# Patient Record
Sex: MALE | Race: WHITE | NOT HISPANIC OR LATINO | Employment: OTHER | URBAN - METROPOLITAN AREA
[De-identification: names, ages, dates, MRNs, and addresses within clinical notes are randomized per-mention and may not be internally consistent; named-entity substitution may affect disease eponyms.]

---

## 2017-01-26 ENCOUNTER — OFFICE VISIT (OUTPATIENT)
Dept: FAMILY MEDICINE | Facility: CLINIC | Age: 62
End: 2017-01-26
Attending: FAMILY MEDICINE
Payer: COMMERCIAL

## 2017-01-26 ENCOUNTER — LAB VISIT (OUTPATIENT)
Dept: LAB | Facility: HOSPITAL | Age: 62
End: 2017-01-26
Attending: FAMILY MEDICINE
Payer: COMMERCIAL

## 2017-01-26 VITALS
WEIGHT: 258.38 LBS | DIASTOLIC BLOOD PRESSURE: 70 MMHG | SYSTOLIC BLOOD PRESSURE: 108 MMHG | HEIGHT: 73 IN | BODY MASS INDEX: 34.24 KG/M2 | HEART RATE: 80 BPM | OXYGEN SATURATION: 97 %

## 2017-01-26 DIAGNOSIS — I15.2 HYPERTENSION ASSOCIATED WITH DIABETES: ICD-10-CM

## 2017-01-26 DIAGNOSIS — E11.42 DIABETIC PERIPHERAL NEUROPATHY ASSOCIATED WITH TYPE 2 DIABETES MELLITUS: ICD-10-CM

## 2017-01-26 DIAGNOSIS — E29.1 HYPOGONADISM, MALE: ICD-10-CM

## 2017-01-26 DIAGNOSIS — E11.42 TYPE 2 DIABETES MELLITUS WITH DIABETIC POLYNEUROPATHY, WITHOUT LONG-TERM CURRENT USE OF INSULIN: ICD-10-CM

## 2017-01-26 DIAGNOSIS — E78.5 HYPERLIPIDEMIA ASSOCIATED WITH TYPE 2 DIABETES MELLITUS: ICD-10-CM

## 2017-01-26 DIAGNOSIS — E11.59 HYPERTENSION ASSOCIATED WITH DIABETES: ICD-10-CM

## 2017-01-26 DIAGNOSIS — E11.42 TYPE 2 DIABETES MELLITUS WITH DIABETIC POLYNEUROPATHY, WITHOUT LONG-TERM CURRENT USE OF INSULIN: Primary | ICD-10-CM

## 2017-01-26 DIAGNOSIS — E11.69 HYPERLIPIDEMIA ASSOCIATED WITH TYPE 2 DIABETES MELLITUS: ICD-10-CM

## 2017-01-26 DIAGNOSIS — E55.9 VITAMIN D DEFICIENCY: ICD-10-CM

## 2017-01-26 LAB
25(OH)D3+25(OH)D2 SERPL-MCNC: 30 NG/ML
ANION GAP SERPL CALC-SCNC: 6 MMOL/L
BUN SERPL-MCNC: 15 MG/DL
CALCIUM SERPL-MCNC: 9.4 MG/DL
CHLORIDE SERPL-SCNC: 103 MMOL/L
CO2 SERPL-SCNC: 28 MMOL/L
CREAT SERPL-MCNC: 1 MG/DL
EST. GFR  (AFRICAN AMERICAN): >60 ML/MIN/1.73 M^2
EST. GFR  (NON AFRICAN AMERICAN): >60 ML/MIN/1.73 M^2
GLUCOSE SERPL-MCNC: 128 MG/DL
POTASSIUM SERPL-SCNC: 4.4 MMOL/L
SODIUM SERPL-SCNC: 137 MMOL/L

## 2017-01-26 PROCEDURE — 4010F ACE/ARB THERAPY RXD/TAKEN: CPT | Mod: S$GLB,,, | Performed by: FAMILY MEDICINE

## 2017-01-26 PROCEDURE — 3078F DIAST BP <80 MM HG: CPT | Mod: S$GLB,,, | Performed by: FAMILY MEDICINE

## 2017-01-26 PROCEDURE — 2022F DILAT RTA XM EVC RTNOPTHY: CPT | Mod: S$GLB,,, | Performed by: FAMILY MEDICINE

## 2017-01-26 PROCEDURE — 36415 COLL VENOUS BLD VENIPUNCTURE: CPT | Mod: PO

## 2017-01-26 PROCEDURE — 82306 VITAMIN D 25 HYDROXY: CPT

## 2017-01-26 PROCEDURE — 80048 BASIC METABOLIC PNL TOTAL CA: CPT

## 2017-01-26 PROCEDURE — 99214 OFFICE O/P EST MOD 30 MIN: CPT | Mod: 25,S$GLB,, | Performed by: FAMILY MEDICINE

## 2017-01-26 PROCEDURE — 83036 HEMOGLOBIN GLYCOSYLATED A1C: CPT

## 2017-01-26 PROCEDURE — 90471 IMMUNIZATION ADMIN: CPT | Mod: S$GLB,,, | Performed by: FAMILY MEDICINE

## 2017-01-26 PROCEDURE — 99999 PR PBB SHADOW E&M-EST. PATIENT-LVL IV: CPT | Mod: PBBFAC,,, | Performed by: FAMILY MEDICINE

## 2017-01-26 PROCEDURE — 90686 IIV4 VACC NO PRSV 0.5 ML IM: CPT | Mod: S$GLB,,, | Performed by: FAMILY MEDICINE

## 2017-01-26 PROCEDURE — 3045F PR MOST RECENT HEMOGLOBIN A1C LEVEL 7.0-9.0%: CPT | Mod: S$GLB,,, | Performed by: FAMILY MEDICINE

## 2017-01-26 PROCEDURE — 1159F MED LIST DOCD IN RCRD: CPT | Mod: S$GLB,,, | Performed by: FAMILY MEDICINE

## 2017-01-26 PROCEDURE — 3074F SYST BP LT 130 MM HG: CPT | Mod: S$GLB,,, | Performed by: FAMILY MEDICINE

## 2017-01-26 RX ORDER — PREGABALIN 150 MG/1
1 CAPSULE ORAL 2 TIMES DAILY
Refills: 5 | COMMUNITY
Start: 2017-01-07

## 2017-01-26 RX ORDER — MELOXICAM 15 MG/1
1 TABLET ORAL DAILY
Refills: 5 | COMMUNITY
Start: 2017-01-07 | End: 2019-07-07

## 2017-01-26 NOTE — MR AVS SNAPSHOT
[facility]  [address] [place] [address]  [address]  [facility] [address]  Phone: [phone]                  Zhao Dodd   2017 8:40 AM   Office Visit    Description:  Male : 1955   Provider:  Gopal Chavez Jr., MD   Department:  [facility]           Reason for Visit     Follow-up           Diagnoses this Visit        Comments    Type 2 diabetes mellitus with diabetic polyneuropathy, without long-term current use of insulin    -  Primary     Diabetic peripheral neuropathy associated with type 2 diabetes mellitus         Hypertension associated with diabetes         Vitamin D deficiency         Hyperlipidemia associated with type 2 diabetes mellitus         Hypogonadism, male                To Do List           Goals (5 Years of Data)              Today    16    Blood Pressure < 135/85   108/70  116/78      Related Problems    Hypertension associated with diabetes    HEMOGLOBIN A1C < 7.0       7.0    Related Problems    Diabetic peripheral neuropathy associated with type 2 diabetes mellitus    LDL CALC < 70       63.6    Related Problems    Hyperlipidemia associated with type 2 diabetes mellitus       These Medications        Disp Refills Start End    zoster vaccine live, PF, (ZOSTAVAX, PF,) 19,400 unit/0.65 mL injection 1 vial 0 2017    Inject 19,400 Units into the skin once. - Subcutaneous    Pharmacy: [facility] [address] [place] [address] ISABEL GERARDO AT [facility] & [facility] Ph #: [phone]         [facility]     [facility] On [facility] Care Line - 24/ Assistance  Registered nurses in the [facility] On Call Center provide clinical advisement, health education, appointment booking, and other advisory services.  Call for this free service at [phone].             Medications           Message regarding Medications     Verify the changes and/or additions to your medication regime listed  "below are the same as discussed with your clinician today.  If any of these changes or additions are incorrect, please notify your healthcare provider.        START taking these NEW medications        Refills    zoster vaccine live, PF, (ZOSTAVAX, PF,) 19,400 unit/0.65 mL injection 0    Sig: Inject 19,400 Units into the skin once.    Class: Normal    Route: Subcutaneous      STOP taking these medications     glimepiride (AMARYL) 1 MG tablet Take 1 tablet (1 mg total) by mouth once daily.    ketoconazole (NIZORAL) 2 % cream 1 application once daily.    OXISTAT 1 % Crea            Verify that the below list of medications is an accurate representation of the medications you are currently taking.  If none reported, the list may be blank. If incorrect, please contact your healthcare provider. Carry this list with you in case of emergency.           Current Medications     aspirin 81 mg Tab Take 1 tablet by mouth.    BD INSULIN PEN NEEDLE UF MINI 31 gauge x 3/16" Ndle     blood sugar diagnostic Strp Checks CBG 3 times a day. Dispense with lancets    canagliflozin (INVOKANA) 300 mg Tab Take 300 mg by mouth once daily.    exenatide microspheres (BYDUREON) 2 mg/0.65 mL PnIj Inject 2 mg into the skin every 7 days.    fexofenadine (ALLEGRA) 180 MG tablet Take 1 tablet (180 mg total) by mouth once daily.    FLUOROPLEX 1 % cream     GEL-SELWYN 0.4 % Gel As directed    hydrocodone-acetaminophen 7.5-325mg (NORCO) 7.5-325 mg per tablet Take 1 tablet by mouth every 12 (twelve) hours as needed.    insulin lispro (HUMALOG KWIKPEN) 100 unit/mL InPn pen Sliding scale only.  One box per month    lisinopril 10 MG tablet Take 1 tablet (10 mg total) by mouth once daily.    LYRICA 150 mg capsule Take 1 capsule by mouth 2 (two) times daily.    meloxicam (MOBIC) 15 MG tablet Take 1 tablet by mouth once daily.    metformin (GLUCOPHAGE) 1000 MG tablet Take 1 tablet (1,000 mg total) by mouth 2 (two) times daily with meals.    omega-3 fatty " "acids-vitamin E (FISH OIL) 1,000 mg Cap Take 1,000 mg by mouth Twice daily.    pen needle, diabetic (BD INSULIN PEN NEEDLE UF MINI) 31 gauge x 3/16" Ndle USE WITH HUMALOG PEN    tadalafil (CIALIS) 10 MG tablet TAKE 1 TABLET BY MOUTH AS NEEDED    testosterone (ANDROGEL) 20.25 mg/1.25 gram (1.62 %) GlPm APPLY 2 PUMPS TOPICALLY ONCE DAILY AS DIRECTED    vitamin D 1000 units Tab Take 185 mg by mouth once daily.    VYTORIN 10-40 10-40 mg per tablet TAKE ONE TABLET BY MOUTH EVERY NIGHT AT BEDTIME    zoster vaccine live, PF, (ZOSTAVAX, PF,) 19,400 unit/0.65 mL injection Inject 19,400 Units into the skin once.           Clinical Reference Information           Vital Signs - Last Recorded  Most recent update: 1/26/2017  8:44 AM by Gopal Chavez Jr., MD    BP Pulse Ht Wt SpO2 BMI    108/70 (BP Location: Left arm, Patient Position: Sitting, BP Method: Manual) 80 6' 1" (1.854 m) 117.2 kg (258 lb 6.4 oz) 97% 34.09 kg/m2      Blood Pressure          Most Recent Value    BP  108/70      Allergies as of 1/26/2017     Penicillins      Immunizations Administered on Date of Encounter - 1/26/2017     None      Orders Placed During Today's Visit     Future Labs/Procedures Expected by Expires    Basic metabolic panel  1/26/2017 1/26/2018    Hemoglobin A1c  1/26/2017 1/27/2018    Vitamin D  1/26/2017 1/26/2018      "

## 2017-01-26 NOTE — PROGRESS NOTES
"Subjective:       Patient ID: Zhao Dodd is a 61 y.o. male.    Chief Complaint: Follow-up (DM)    Diabetes   He presents for his follow-up diabetic visit. He has type 2 diabetes mellitus. His disease course has been stable. There are no hypoglycemic associated symptoms. Associated symptoms include fatigue (frequent: 2/t stress) and polydipsia. Pertinent negatives for diabetes include no blurred vision, no chest pain, no foot paresthesias, no foot ulcerations, no polyphagia, no polyuria, no visual change and no weight loss. There are no hypoglycemic complications. Symptoms are stable. Diabetic complications include peripheral neuropathy. Pertinent negatives for diabetic complications include no CVA, heart disease or nephropathy. Risk factors for coronary artery disease include diabetes mellitus, dyslipidemia, male sex, hypertension and obesity. Current diabetic treatment includes oral agent (triple therapy) and insulin injections (plus GLP-1 agonist). He is compliant with treatment most of the time. His weight is stable. He is following a generally healthy diet. He participates in exercise three times a week. There is no change in his home blood glucose trend. His breakfast blood glucose is taken between 7-8 am. His breakfast blood glucose range is generally 130-140 mg/dl. An ACE inhibitor/angiotensin II receptor blocker is being taken. He does not see a podiatrist.Eye exam is current.        Patient Active Problem List   Diagnosis    Diabetic peripheral neuropathy associated with type 2 diabetes mellitus    Hypertension associated with diabetes    Hyperlipidemia associated with type 2 diabetes mellitus    Hypogonadism, male    AR (allergic rhinitis)    Vitamin D deficiency    Obesity    Type 2 diabetes mellitus with diabetic polyneuropathy         Current Outpatient Prescriptions:     aspirin 81 mg Tab, Take 1 tablet by mouth., Disp: , Rfl:     BD INSULIN PEN NEEDLE UF MINI 31 gauge x 3/16" Ndle, , " "Disp: , Rfl: 0    blood sugar diagnostic Strp, Checks CBG 3 times a day. Dispense with lancets, Disp: 100 strip, Rfl: 11    canagliflozin (INVOKANA) 300 mg Tab, Take 300 mg by mouth once daily., Disp: 30 tablet, Rfl: 11    exenatide microspheres (BYDUREON) 2 mg/0.65 mL PnIj, Inject 2 mg into the skin every 7 days., Disp: 4 each, Rfl: 11    fexofenadine (ALLEGRA) 180 MG tablet, Take 1 tablet (180 mg total) by mouth once daily., Disp: 30 tablet, Rfl: 2    FLUOROPLEX 1 % cream, , Disp: , Rfl:     GEL-SELWYN 0.4 % Gel, As directed, Disp: , Rfl: 0    hydrocodone-acetaminophen 7.5-325mg (NORCO) 7.5-325 mg per tablet, Take 1 tablet by mouth every 12 (twelve) hours as needed., Disp: , Rfl:     insulin lispro (HUMALOG KWIKPEN) 100 unit/mL InPn pen, Sliding scale only.  One box per month, Disp: 1 Box, Rfl: 6    lisinopril 10 MG tablet, Take 1 tablet (10 mg total) by mouth once daily., Disp: 90 tablet, Rfl: 3    LYRICA 150 mg capsule, Take 1 capsule by mouth 2 (two) times daily., Disp: , Rfl: 5    meloxicam (MOBIC) 15 MG tablet, Take 1 tablet by mouth once daily., Disp: , Rfl: 5    metformin (GLUCOPHAGE) 1000 MG tablet, Take 1 tablet (1,000 mg total) by mouth 2 (two) times daily with meals., Disp: 180 tablet, Rfl: 3    omega-3 fatty acids-vitamin E (FISH OIL) 1,000 mg Cap, Take 1,000 mg by mouth Twice daily., Disp: , Rfl:     pen needle, diabetic (BD INSULIN PEN NEEDLE UF MINI) 31 gauge x 3/16" Ndle, USE WITH HUMALOG PEN, Disp: 100 each, Rfl: 11    tadalafil (CIALIS) 10 MG tablet, TAKE 1 TABLET BY MOUTH AS NEEDED, Disp: 10 tablet, Rfl: 5    testosterone (ANDROGEL) 20.25 mg/1.25 gram (1.62 %) GlPm, APPLY 2 PUMPS TOPICALLY ONCE DAILY AS DIRECTED, Disp: 150 g, Rfl: 5    vitamin D 1000 units Tab, Take 185 mg by mouth once daily., Disp: , Rfl:     VYTORIN 10-40 10-40 mg per tablet, TAKE ONE TABLET BY MOUTH EVERY NIGHT AT BEDTIME, Disp: 30 tablet, Rfl: 11    There have been no changes to the patient's past medical, " surgical, family, or social histories.      Review of Systems   Constitutional: Positive for fatigue (frequent: 2/t stress). Negative for weight loss.   Eyes: Negative for blurred vision.   Cardiovascular: Negative for chest pain.   Endocrine: Positive for polydipsia. Negative for polyphagia and polyuria.       Objective:      Physical Exam   Constitutional: He is oriented to person, place, and time. He appears well-developed and well-nourished. He is cooperative.   HENT:   Head: Normocephalic and atraumatic.   Right Ear: External ear normal.   Left Ear: External ear normal.   Nose: Nose normal.   Mouth/Throat: Oropharynx is clear and moist and mucous membranes are normal. No oropharyngeal exudate.   Eyes: Conjunctivae are normal. No scleral icterus.   Neck: Neck supple. No JVD present. Carotid bruit is not present. No thyromegaly present.   Cardiovascular: Normal rate, regular rhythm, normal heart sounds and normal pulses.  Exam reveals no gallop and no friction rub.    No murmur heard.  Pulmonary/Chest: Effort normal and breath sounds normal. He has no wheezes. He has no rhonchi. He has no rales.   Abdominal: Soft. Bowel sounds are normal. He exhibits no distension and no mass. There is no splenomegaly or hepatomegaly. There is no tenderness.   Musculoskeletal: Normal range of motion. He exhibits no edema or tenderness.   Lymphadenopathy:     He has no cervical adenopathy.     He has no axillary adenopathy.   Neurological: He is alert and oriented to person, place, and time. He has normal strength and normal reflexes. No cranial nerve deficit or sensory deficit. Coordination normal.   Skin: Skin is warm and dry.   Psychiatric: He has a normal mood and affect.   Vitals reviewed.        Assessment:       1. Type 2 diabetes mellitus with diabetic polyneuropathy, without long-term current use of insulin    2. Diabetic peripheral neuropathy associated with type 2 diabetes mellitus    3. Hypertension associated with  "diabetes    4. Vitamin D deficiency    5. Hyperlipidemia associated with type 2 diabetes mellitus    6. Hypogonadism, male          Plan:       Labs (see Orders).  Continue present medications for now.  Discussed routine examinations and screenings.   Rx for Zostavax sent to patient's pharmacy.   Flu vaccine today.  Discussed with patient the importance of lifestyle modifications, including well-balanced diet and moderate exercise regimen, in reducing risk for cardiovascular/cerebrovascular disease and diabetes.  We will call the patient with results & make further recommendations at that time.        "This note will not be shared with the patient."  "

## 2017-01-26 NOTE — PROGRESS NOTES
Patient was given Influenza in Right Deltoid as per orders from Dr. Chavez. Aseptic tech used and pt tolerated well. Pt was monitored for 15 mins with no reaction noted.

## 2017-01-27 LAB
ESTIMATED AVG GLUCOSE: 163 MG/DL
HBA1C MFR BLD HPLC: 7.3 %

## 2017-01-28 ENCOUNTER — PATIENT MESSAGE (OUTPATIENT)
Dept: FAMILY MEDICINE | Facility: CLINIC | Age: 62
End: 2017-01-28

## 2017-02-13 ENCOUNTER — PATIENT MESSAGE (OUTPATIENT)
Dept: ENDOCRINOLOGY | Facility: CLINIC | Age: 62
End: 2017-02-13

## 2017-03-06 RX ORDER — BLOOD-GLUCOSE METER
EACH MISCELLANEOUS
Qty: 100 STRIP | Refills: 0 | Status: SHIPPED | OUTPATIENT
Start: 2017-03-06 | End: 2017-03-30 | Stop reason: SDUPTHER

## 2017-03-16 RX ORDER — INSULIN LISPRO 100 [IU]/ML
INJECTION, SOLUTION INTRAVENOUS; SUBCUTANEOUS
Qty: 15 ML | Refills: 0 | Status: SHIPPED | OUTPATIENT
Start: 2017-03-16 | End: 2017-04-25 | Stop reason: SDUPTHER

## 2017-03-30 RX ORDER — BLOOD-GLUCOSE METER
EACH MISCELLANEOUS
Qty: 100 STRIP | Refills: 0 | Status: SHIPPED | OUTPATIENT
Start: 2017-03-30 | End: 2017-04-25 | Stop reason: SDUPTHER

## 2017-04-10 RX ORDER — TESTOSTERONE 16.2 MG/G
GEL TRANSDERMAL
Qty: 150 G | Refills: 5 | Status: SHIPPED | OUTPATIENT
Start: 2017-04-10 | End: 2017-10-09 | Stop reason: SDUPTHER

## 2017-04-25 RX ORDER — BLOOD-GLUCOSE METER
EACH MISCELLANEOUS
Qty: 100 STRIP | Refills: 0 | Status: SHIPPED | OUTPATIENT
Start: 2017-04-25 | End: 2017-05-18 | Stop reason: SDUPTHER

## 2017-04-26 RX ORDER — INSULIN LISPRO 100 [IU]/ML
INJECTION, SOLUTION INTRAVENOUS; SUBCUTANEOUS
Qty: 15 ML | Refills: 0 | Status: SHIPPED | OUTPATIENT
Start: 2017-04-26 | End: 2017-06-28 | Stop reason: SDUPTHER

## 2017-05-18 RX ORDER — BLOOD-GLUCOSE METER
EACH MISCELLANEOUS
Qty: 100 STRIP | Refills: 0 | Status: SHIPPED | OUTPATIENT
Start: 2017-05-18 | End: 2017-06-14 | Stop reason: SDUPTHER

## 2017-06-14 RX ORDER — BLOOD-GLUCOSE METER
EACH MISCELLANEOUS
Qty: 100 STRIP | Refills: 0 | Status: SHIPPED | OUTPATIENT
Start: 2017-06-14 | End: 2017-07-09 | Stop reason: SDUPTHER

## 2017-06-28 RX ORDER — INSULIN LISPRO 100 [IU]/ML
INJECTION, SOLUTION INTRAVENOUS; SUBCUTANEOUS
Qty: 15 ML | Refills: 3 | Status: SHIPPED | OUTPATIENT
Start: 2017-06-28 | End: 2017-10-09 | Stop reason: SDUPTHER

## 2017-07-03 RX ORDER — PEN NEEDLE, DIABETIC 31 GX5/16"
NEEDLE, DISPOSABLE MISCELLANEOUS
Qty: 100 EACH | Refills: 0 | Status: SHIPPED | OUTPATIENT
Start: 2017-07-03 | End: 2017-09-01 | Stop reason: SDUPTHER

## 2017-07-10 RX ORDER — BLOOD-GLUCOSE METER
EACH MISCELLANEOUS
Qty: 100 STRIP | Refills: 0 | Status: SHIPPED | OUTPATIENT
Start: 2017-07-10 | End: 2017-07-30 | Stop reason: SDUPTHER

## 2017-07-30 DIAGNOSIS — Z79.4 TYPE 2 DIABETES MELLITUS WITHOUT COMPLICATION, WITH LONG-TERM CURRENT USE OF INSULIN: Primary | ICD-10-CM

## 2017-07-30 DIAGNOSIS — E11.9 TYPE 2 DIABETES MELLITUS WITHOUT COMPLICATION, WITH LONG-TERM CURRENT USE OF INSULIN: Primary | ICD-10-CM

## 2017-07-31 RX ORDER — GLIMEPIRIDE 1 MG/1
TABLET ORAL
Qty: 90 TABLET | Refills: 0 | Status: SHIPPED | OUTPATIENT
Start: 2017-07-31 | End: 2017-10-09 | Stop reason: SDUPTHER

## 2017-07-31 RX ORDER — EZETIMIBE AND SIMVASTATIN 10; 40 MG/1; MG/1
TABLET ORAL
Qty: 30 TABLET | Refills: 0 | Status: SHIPPED | OUTPATIENT
Start: 2017-07-31 | End: 2017-10-02 | Stop reason: SDUPTHER

## 2017-07-31 RX ORDER — CANAGLIFLOZIN 300 MG/1
TABLET, FILM COATED ORAL
Qty: 30 TABLET | Refills: 2 | Status: SHIPPED | OUTPATIENT
Start: 2017-07-31 | End: 2017-10-09 | Stop reason: SDUPTHER

## 2017-07-31 RX ORDER — BLOOD-GLUCOSE METER
EACH MISCELLANEOUS
Qty: 100 STRIP | Refills: 0 | Status: SHIPPED | OUTPATIENT
Start: 2017-07-31 | End: 2017-08-30 | Stop reason: SDUPTHER

## 2017-07-31 RX ORDER — METFORMIN HYDROCHLORIDE 1000 MG/1
TABLET ORAL
Qty: 180 TABLET | Refills: 0 | Status: SHIPPED | OUTPATIENT
Start: 2017-07-31 | End: 2017-10-28 | Stop reason: SDUPTHER

## 2017-08-30 RX ORDER — BLOOD-GLUCOSE METER
EACH MISCELLANEOUS
Qty: 100 STRIP | Refills: 0 | Status: SHIPPED | OUTPATIENT
Start: 2017-08-30 | End: 2017-09-28 | Stop reason: SDUPTHER

## 2017-08-30 RX ORDER — EZETIMIBE AND SIMVASTATIN 10; 40 MG/1; MG/1
TABLET ORAL
Qty: 30 TABLET | Refills: 4 | Status: SHIPPED | OUTPATIENT
Start: 2017-08-30 | End: 2017-10-02 | Stop reason: SDUPTHER

## 2017-09-01 ENCOUNTER — PATIENT MESSAGE (OUTPATIENT)
Dept: ENDOCRINOLOGY | Facility: CLINIC | Age: 62
End: 2017-09-01

## 2017-09-01 DIAGNOSIS — E11.9 TYPE 2 DIABETES MELLITUS WITHOUT COMPLICATION: ICD-10-CM

## 2017-09-01 RX ORDER — PEN NEEDLE, DIABETIC 30 GX3/16"
NEEDLE, DISPOSABLE MISCELLANEOUS
Qty: 100 EACH | Refills: 0 | Status: SHIPPED | OUTPATIENT
Start: 2017-09-01 | End: 2017-10-02 | Stop reason: SDUPTHER

## 2017-09-19 ENCOUNTER — TELEPHONE (OUTPATIENT)
Dept: FAMILY MEDICINE | Facility: CLINIC | Age: 62
End: 2017-09-19

## 2017-09-19 ENCOUNTER — TELEPHONE (OUTPATIENT)
Dept: ENDOCRINOLOGY | Facility: CLINIC | Age: 62
End: 2017-09-19

## 2017-09-19 DIAGNOSIS — I15.2 HYPERTENSION ASSOCIATED WITH DIABETES: ICD-10-CM

## 2017-09-19 DIAGNOSIS — E11.42 DIABETIC PERIPHERAL NEUROPATHY ASSOCIATED WITH TYPE 2 DIABETES MELLITUS: ICD-10-CM

## 2017-09-19 DIAGNOSIS — E11.59 HYPERTENSION ASSOCIATED WITH DIABETES: ICD-10-CM

## 2017-09-19 DIAGNOSIS — E11.42 DIABETIC PERIPHERAL NEUROPATHY ASSOCIATED WITH TYPE 2 DIABETES MELLITUS: Primary | ICD-10-CM

## 2017-09-19 DIAGNOSIS — E11.42 TYPE 2 DIABETES MELLITUS WITH DIABETIC POLYNEUROPATHY, WITHOUT LONG-TERM CURRENT USE OF INSULIN: ICD-10-CM

## 2017-09-19 DIAGNOSIS — E29.1 HYPOGONADISM, MALE: ICD-10-CM

## 2017-09-19 DIAGNOSIS — E55.9 VITAMIN D DEFICIENCY: ICD-10-CM

## 2017-09-19 DIAGNOSIS — E11.69 HYPERLIPIDEMIA ASSOCIATED WITH TYPE 2 DIABETES MELLITUS: ICD-10-CM

## 2017-09-19 DIAGNOSIS — E78.5 HYPERLIPIDEMIA ASSOCIATED WITH TYPE 2 DIABETES MELLITUS: ICD-10-CM

## 2017-09-19 DIAGNOSIS — E11.42 TYPE 2 DIABETES MELLITUS WITH DIABETIC POLYNEUROPATHY, WITHOUT LONG-TERM CURRENT USE OF INSULIN: Primary | ICD-10-CM

## 2017-09-19 NOTE — TELEPHONE ENCOUNTER
----- Message from Cortes De Los Santos sent at 9/19/2017  2:03 PM CDT -----  Contact: Zhao Dodd  X_  1st Request  _  2nd Request  _  3rd Request        Who: Zhao Dodd    Why: Patient scheduled for Oct 2 with Dr. Chavez and would like his blood work before his appt. Patient needs orders. Please call back to follow up.    What Number to Call Back: [phone]    When to Expect a call back: (With in 24 hours)

## 2017-09-26 ENCOUNTER — LAB VISIT (OUTPATIENT)
Dept: LAB | Facility: HOSPITAL | Age: 62
End: 2017-09-26
Attending: FAMILY MEDICINE
Payer: COMMERCIAL

## 2017-09-26 DIAGNOSIS — I15.2 HYPERTENSION ASSOCIATED WITH DIABETES: ICD-10-CM

## 2017-09-26 DIAGNOSIS — E11.42 DIABETIC PERIPHERAL NEUROPATHY ASSOCIATED WITH TYPE 2 DIABETES MELLITUS: ICD-10-CM

## 2017-09-26 DIAGNOSIS — E29.1 HYPOGONADISM, MALE: ICD-10-CM

## 2017-09-26 DIAGNOSIS — E11.69 HYPERLIPIDEMIA ASSOCIATED WITH TYPE 2 DIABETES MELLITUS: ICD-10-CM

## 2017-09-26 DIAGNOSIS — E11.59 HYPERTENSION ASSOCIATED WITH DIABETES: ICD-10-CM

## 2017-09-26 DIAGNOSIS — E55.9 VITAMIN D DEFICIENCY: ICD-10-CM

## 2017-09-26 DIAGNOSIS — E78.5 HYPERLIPIDEMIA ASSOCIATED WITH TYPE 2 DIABETES MELLITUS: ICD-10-CM

## 2017-09-26 DIAGNOSIS — E11.42 TYPE 2 DIABETES MELLITUS WITH DIABETIC POLYNEUROPATHY, WITHOUT LONG-TERM CURRENT USE OF INSULIN: ICD-10-CM

## 2017-09-26 LAB
25(OH)D3+25(OH)D2 SERPL-MCNC: 28 NG/ML
ALBUMIN SERPL BCP-MCNC: 3.6 G/DL
ALP SERPL-CCNC: 58 U/L
ALT SERPL W/O P-5'-P-CCNC: 9 U/L
ANION GAP SERPL CALC-SCNC: 5 MMOL/L
AST SERPL-CCNC: 18 U/L
BASOPHILS # BLD AUTO: 0.08 K/UL
BASOPHILS NFR BLD: 1 %
BILIRUB SERPL-MCNC: 0.3 MG/DL
BUN SERPL-MCNC: 16 MG/DL
CALCIUM SERPL-MCNC: 9.2 MG/DL
CHLORIDE SERPL-SCNC: 106 MMOL/L
CHOLEST SERPL-MCNC: 137 MG/DL
CHOLEST/HDLC SERPL: 3.4 {RATIO}
CO2 SERPL-SCNC: 26 MMOL/L
COMPLEXED PSA SERPL-MCNC: 0.8 NG/ML
CREAT SERPL-MCNC: 0.9 MG/DL
DIFFERENTIAL METHOD: ABNORMAL
EOSINOPHIL # BLD AUTO: 0.8 K/UL
EOSINOPHIL NFR BLD: 9.3 %
ERYTHROCYTE [DISTWIDTH] IN BLOOD BY AUTOMATED COUNT: 14.4 %
EST. GFR  (AFRICAN AMERICAN): >60 ML/MIN/1.73 M^2
EST. GFR  (NON AFRICAN AMERICAN): >60 ML/MIN/1.73 M^2
ESTIMATED AVG GLUCOSE: 154 MG/DL
GLUCOSE SERPL-MCNC: 151 MG/DL
HBA1C MFR BLD HPLC: 7 %
HCT VFR BLD AUTO: 42.9 %
HDLC SERPL-MCNC: 40 MG/DL
HDLC SERPL: 29.2 %
HGB BLD-MCNC: 13.7 G/DL
LDLC SERPL CALC-MCNC: 57.2 MG/DL
LYMPHOCYTES # BLD AUTO: 2.2 K/UL
LYMPHOCYTES NFR BLD: 25.8 %
MCH RBC QN AUTO: 27 PG
MCHC RBC AUTO-ENTMCNC: 31.9 G/DL
MCV RBC AUTO: 85 FL
MONOCYTES # BLD AUTO: 0.8 K/UL
MONOCYTES NFR BLD: 9.3 %
NEUTROPHILS # BLD AUTO: 4.6 K/UL
NEUTROPHILS NFR BLD: 54.5 %
NONHDLC SERPL-MCNC: 97 MG/DL
PLATELET # BLD AUTO: 301 K/UL
PMV BLD AUTO: 10.8 FL
POTASSIUM SERPL-SCNC: 4.2 MMOL/L
PROT SERPL-MCNC: 6.5 G/DL
RBC # BLD AUTO: 5.07 M/UL
SODIUM SERPL-SCNC: 137 MMOL/L
TESTOST SERPL-MCNC: 422 NG/DL
TRIGL SERPL-MCNC: 199 MG/DL
WBC # BLD AUTO: 8.38 K/UL

## 2017-09-26 PROCEDURE — 80061 LIPID PANEL: CPT

## 2017-09-26 PROCEDURE — 36415 COLL VENOUS BLD VENIPUNCTURE: CPT | Mod: PO

## 2017-09-26 PROCEDURE — 84153 ASSAY OF PSA TOTAL: CPT

## 2017-09-26 PROCEDURE — 84403 ASSAY OF TOTAL TESTOSTERONE: CPT

## 2017-09-26 PROCEDURE — 82306 VITAMIN D 25 HYDROXY: CPT

## 2017-09-26 PROCEDURE — 85025 COMPLETE CBC W/AUTO DIFF WBC: CPT

## 2017-09-26 PROCEDURE — 83036 HEMOGLOBIN GLYCOSYLATED A1C: CPT

## 2017-09-26 PROCEDURE — 80053 COMPREHEN METABOLIC PANEL: CPT

## 2017-09-28 RX ORDER — BLOOD-GLUCOSE METER
EACH MISCELLANEOUS
Qty: 100 STRIP | Refills: 0 | Status: SHIPPED | OUTPATIENT
Start: 2017-09-28 | End: 2017-10-02 | Stop reason: SDUPTHER

## 2017-10-01 RX ORDER — ZONISAMIDE 50 MG/1
CAPSULE ORAL
Refills: 0 | COMMUNITY
Start: 2017-09-05 | End: 2017-10-02

## 2017-10-01 RX ORDER — LEVOFLOXACIN 500 MG/1
TABLET, FILM COATED ORAL
Refills: 1 | COMMUNITY
Start: 2017-09-12 | End: 2017-10-02 | Stop reason: ALTCHOICE

## 2017-10-01 RX ORDER — TRIAMCINOLONE ACETONIDE 1 MG/G
CREAM TOPICAL
Refills: 2 | Status: ON HOLD | COMMUNITY
Start: 2017-08-24 | End: 2019-03-13 | Stop reason: CLARIF

## 2017-10-01 RX ORDER — PROMETHAZINE HYDROCHLORIDE AND DEXTROMETHORPHAN HYDROBROMIDE 6.25; 15 MG/5ML; MG/5ML
SYRUP ORAL
Refills: 1 | COMMUNITY
Start: 2017-09-12 | End: 2017-10-02 | Stop reason: ALTCHOICE

## 2017-10-02 ENCOUNTER — OFFICE VISIT (OUTPATIENT)
Dept: FAMILY MEDICINE | Facility: CLINIC | Age: 62
End: 2017-10-02
Attending: FAMILY MEDICINE
Payer: COMMERCIAL

## 2017-10-02 VITALS
WEIGHT: 247.38 LBS | HEART RATE: 85 BPM | DIASTOLIC BLOOD PRESSURE: 76 MMHG | BODY MASS INDEX: 32.79 KG/M2 | RESPIRATION RATE: 16 BRPM | HEIGHT: 73 IN | OXYGEN SATURATION: 98 % | SYSTOLIC BLOOD PRESSURE: 120 MMHG

## 2017-10-02 DIAGNOSIS — I15.2 HYPERTENSION ASSOCIATED WITH DIABETES: ICD-10-CM

## 2017-10-02 DIAGNOSIS — E78.5 HYPERLIPIDEMIA ASSOCIATED WITH TYPE 2 DIABETES MELLITUS: ICD-10-CM

## 2017-10-02 DIAGNOSIS — E55.9 VITAMIN D DEFICIENCY: ICD-10-CM

## 2017-10-02 DIAGNOSIS — E11.42 TYPE 2 DIABETES MELLITUS WITH DIABETIC POLYNEUROPATHY, WITHOUT LONG-TERM CURRENT USE OF INSULIN: Primary | ICD-10-CM

## 2017-10-02 DIAGNOSIS — E11.59 HYPERTENSION ASSOCIATED WITH DIABETES: ICD-10-CM

## 2017-10-02 DIAGNOSIS — E11.69 HYPERLIPIDEMIA ASSOCIATED WITH TYPE 2 DIABETES MELLITUS: ICD-10-CM

## 2017-10-02 DIAGNOSIS — E11.42 DIABETIC PERIPHERAL NEUROPATHY ASSOCIATED WITH TYPE 2 DIABETES MELLITUS: ICD-10-CM

## 2017-10-02 DIAGNOSIS — E29.1 HYPOGONADISM, MALE: ICD-10-CM

## 2017-10-02 PROCEDURE — 99999 PR PBB SHADOW E&M-EST. PATIENT-LVL III: CPT | Mod: PBBFAC,,, | Performed by: FAMILY MEDICINE

## 2017-10-02 PROCEDURE — 3008F BODY MASS INDEX DOCD: CPT | Mod: S$GLB,,, | Performed by: FAMILY MEDICINE

## 2017-10-02 PROCEDURE — 90471 IMMUNIZATION ADMIN: CPT | Mod: S$GLB,,, | Performed by: FAMILY MEDICINE

## 2017-10-02 PROCEDURE — 3074F SYST BP LT 130 MM HG: CPT | Mod: S$GLB,,, | Performed by: FAMILY MEDICINE

## 2017-10-02 PROCEDURE — 90686 IIV4 VACC NO PRSV 0.5 ML IM: CPT | Mod: S$GLB,,, | Performed by: FAMILY MEDICINE

## 2017-10-02 PROCEDURE — 3078F DIAST BP <80 MM HG: CPT | Mod: S$GLB,,, | Performed by: FAMILY MEDICINE

## 2017-10-02 PROCEDURE — 99214 OFFICE O/P EST MOD 30 MIN: CPT | Mod: 25,S$GLB,, | Performed by: FAMILY MEDICINE

## 2017-10-02 RX ORDER — EZETIMIBE AND SIMVASTATIN 10; 40 MG/1; MG/1
1 TABLET ORAL NIGHTLY
Qty: 30 TABLET | Refills: 11 | Status: SHIPPED | OUTPATIENT
Start: 2017-10-02 | End: 2017-10-09 | Stop reason: SDUPTHER

## 2017-10-02 NOTE — PROGRESS NOTES
"Subjective:       Zhao Dodd is a 62 y.o. male who presents for follow up of diabetes.. Current symptoms include: none. Patient denies foot ulcerations, hyperglycemia, hypoglycemia , increased appetite, nausea, paresthesia of the feet, polydipsia, polyuria, visual disturbances, vomiting and weight loss. Evaluation to date has been: fasting lipid panel and hemoglobin A1C. Home sugars: BGs consistently in an acceptable range. Current treatments: no recent interventions. Last dilated eye exam July, 2017.    Patient Active Problem List   Diagnosis    Diabetic peripheral neuropathy associated with type 2 diabetes mellitus    Hypertension associated with diabetes    Hyperlipidemia associated with type 2 diabetes mellitus    Hypogonadism, male    AR (allergic rhinitis)    Vitamin D deficiency    Obesity    Type 2 diabetes mellitus with diabetic polyneuropathy       Current Outpatient Prescriptions:     ANDROGEL 20.25 mg/1.25 gram (1.62 %) GlPm, APPLY 2 PUMPS TOPICALLY ONCE DAILY, Disp: 150 g, Rfl: 5    aspirin 81 mg Tab, Take 1 tablet by mouth., Disp: , Rfl:     BD INSULIN PEN NEEDLE UF MINI 31 gauge x 3/16" Ndle, , Disp: , Rfl: 0    BYDUREON 2 mg/0.65 mL PnIj, INJECT 2 MG INTO THE SKIN EVERY 7 DAYS, Disp: 4 each, Rfl: 2    ezetimibe-simvastatin 10-40 mg (VYTORIN) 10-40 mg per tablet, TAKE 1 TABLET BY MOUTH EVERY NIGHT AT BEDTIME, Disp: 30 tablet, Rfl: 0    fexofenadine (ALLEGRA) 180 MG tablet, Take 1 tablet (180 mg total) by mouth once daily., Disp: 30 tablet, Rfl: 2    glimepiride (AMARYL) 1 MG tablet, TAKE 1 TABLET(1 MG) BY MOUTH EVERY DAY, Disp: 90 tablet, Rfl: 0    HUMALOG KWIKPEN 100 unit/mL InPn pen, USE PER SLIDING SCALE ONLY AS DIRECTED, Disp: 15 mL, Rfl: 3    INVOKANA 300 mg Tab tablet, TAKE 1 TABLET BY MOUTH EVERY DAY, Disp: 30 tablet, Rfl: 2    lisinopril 10 MG tablet, Take 1 tablet (10 mg total) by mouth once daily., Disp: 90 tablet, Rfl: 3    LYRICA 150 mg capsule, Take 1 capsule by " "mouth 2 (two) times daily., Disp: , Rfl: 5    meloxicam (MOBIC) 15 MG tablet, Take 1 tablet by mouth once daily., Disp: , Rfl: 5    metformin (GLUCOPHAGE) 1000 MG tablet, TAKE 1 TABLET(1000 MG) BY MOUTH TWICE DAILY WITH MEALS, Disp: 180 tablet, Rfl: 0    omega-3 fatty acids-vitamin E (FISH OIL) 1,000 mg Cap, Take 1,000 mg by mouth Twice daily., Disp: , Rfl:     ONETOUCH VERIO Strp, CHECK BLOOD SUGAR THREE TIMES DAILY, Disp: 100 strip, Rfl: 0    pen needle, diabetic (BD INSULIN PEN NEEDLE UF MINI) 31 gauge x 3/16" Ndle, USE WITH HUMALOG PEN, Disp: 100 each, Rfl: 11    tadalafil (CIALIS) 10 MG tablet, TAKE 1 TABLET BY MOUTH AS NEEDED, Disp: 10 tablet, Rfl: 5    triamcinolone acetonide 0.1% (KENALOG) 0.1 % cream, APPLY TO AFFECTED AREA BID FOR 10 TO 14 DAYS, Disp: , Rfl: 2    vitamin D 1000 units Tab, Take 185 mg by mouth once daily., Disp: , Rfl:     GEL-SELWYN 0.4 % Gel, As directed, Disp: , Rfl: 0    The following portions of the patient's history were reviewed and updated as appropriate: allergies, past family history, past medical history, past social history and past surgical history.    Review of Systems  Answers for HPI/ROS submitted by the patient on 9/30/2017   Diabetes problem  Diabetes type: type 2  MedicAlert ID: No  blurred vision: No  chest pain: No  fatigue: No  foot paresthesias: No  foot ulcerations: No  polydipsia: Yes  polyphagia: No  polyuria: No  visual change: No  weakness: No  weight loss: No  Symptom course: stable  confusion: No  dizziness: No  headaches: No  hunger: No  mood changes: No  nervous/anxious: No  pallor: No  seizures: No  sleepiness: No  speech difficulty: No  sweats: No  tremors: No  blackouts: No  hospitalization: No  nocturnal hypoglycemia: No  required assistance: No  required glucagon: No  CVA: No  heart disease: No  impotence: Yes  nephropathy: No  peripheral neuropathy: Yes  PVD: No  retinopathy: No  autonomic neuropathy: No  CAD risks: family history, hypertension, " "diabetes mellitus, male sex  Current treatments: diet, oral agent (triple therapy)  Treatment compliance: all of the time  Home blood tests: 3-4 x per day  Monitoring compliance: excellent  Blood glucose trend: no change  Weight trend: fluctuating minimally  Current diet: diabetic, generally healthy  Meal planning: avoidance of concentrated sweets  Exercise: daily  Dietitian visit: No  Eye exam current: Yes  Sees podiatrist: No     Objective:      /76 (BP Location: Left arm, Patient Position: Sitting, BP Method: Large (Manual))   Pulse 85   Resp 16   Ht 6' 1" (1.854 m)   Wt 112.2 kg (247 lb 6.4 oz)   SpO2 98%   BMI 32.64 kg/m²     General Appearance:    Alert, cooperative, no distress, appears stated age   Head:    Normocephalic, without obvious abnormality, atraumatic   Eyes:    PERRL, conjunctiva/corneas clear, EOM's intact, fundi     benign, both eyes        Ears:    Normal TM's and external ear canals, both ears   Nose:   Nares normal, septum midline, mucosa normal, no drainage    or sinus tenderness   Throat:   Lips, mucosa, and tongue normal; teeth and gums normal   Neck:   Supple, symmetrical, trachea midline, no adenopathy;        thyroid:  No enlargement/tenderness/nodules; no carotid    bruit or JVD   Back:     Symmetric, no curvature, ROM normal, no CVA tenderness   Lungs:     Clear to auscultation bilaterally, respirations unlabored   Chest wall:    No tenderness or deformity   Heart:    Regular rate and rhythm, S1 and S2 normal, no murmur, rub   or gallop   Abdomen:     Soft, non-tender, bowel sounds active all four quadrants,     no masses, no organomegaly   Extremities:   Extremities normal, atraumatic, no cyanosis or edema   Pulses:   2+ and symmetric all extremities   Skin:   Skin color, texture, turgor normal, no rashes or lesions   Lymph nodes:   Cervical, supraclavicular, and axillary nodes normal   Neurologic:   CNII-XII intact. Normal strength, sensation and reflexes       " throughout   Diabetic foot exam:  Good distal pulses.  No corns or calluses.  No loss of protective sensation  Risk Category:0.      Laboratory:    Lab Visit on 09/26/2017   Component Date Value Ref Range Status    Microalbum.,U,Random 09/26/2017 4.0  ug/mL Final    Creatinine, Random Ur 09/26/2017 82.0  23.0 - 375.0 mg/dL Final    Microalb Creat Ratio 09/26/2017 4.9  0.0 - 30.0 ug/mg Final   Lab Visit on 09/26/2017   Component Date Value Ref Range Status    Sodium 09/26/2017 137  136 - 145 mmol/L Final    Potassium 09/26/2017 4.2  3.5 - 5.1 mmol/L Final    Chloride 09/26/2017 106  95 - 110 mmol/L Final    CO2 09/26/2017 26  23 - 29 mmol/L Final    Glucose 09/26/2017 151* 70 - 110 mg/dL Final    BUN, Bld 09/26/2017 16  8 - 23 mg/dL Final    Creatinine 09/26/2017 0.9  0.5 - 1.4 mg/dL Final    Calcium 09/26/2017 9.2  8.7 - 10.5 mg/dL Final    Total Protein 09/26/2017 6.5  6.0 - 8.4 g/dL Final    Albumin 09/26/2017 3.6  3.5 - 5.2 g/dL Final    Total Bilirubin 09/26/2017 0.3  0.1 - 1.0 mg/dL Final    Alkaline Phosphatase 09/26/2017 58  55 - 135 U/L Final    AST 09/26/2017 18  10 - 40 U/L Final    ALT 09/26/2017 9* 10 - 44 U/L Final    Anion Gap 09/26/2017 5* 8 - 16 mmol/L Final    eGFR if African American 09/26/2017 >60.0  >60 mL/min/1.73 m^2 Final    eGFR if non African American 09/26/2017 >60.0  >60 mL/min/1.73 m^2 Final    Cholesterol 09/26/2017 137  120 - 199 mg/dL Final    Triglycerides 09/26/2017 199* 30 - 150 mg/dL Final    HDL 09/26/2017 40  40 - 75 mg/dL Final    LDL Cholesterol 09/26/2017 57.2* 63.0 - 159.0 mg/dL Final    HDL/Chol Ratio 09/26/2017 29.2  20.0 - 50.0 % Final    Total Cholesterol/HDL Ratio 09/26/2017 3.4  2.0 - 5.0 Final    Non-HDL Cholesterol 09/26/2017 97  mg/dL Final    Hemoglobin A1C 09/26/2017 7.0* 4.0 - 5.6 % Final    Estimated Avg Glucose 09/26/2017 154* 68 - 131 mg/dL Final    Testosterone, Total 09/26/2017 422  195.0 - 1138.0 ng/dL Final    PSA DIAGNOSTIC  09/26/2017 0.80  0.00 - 4.00 ng/mL Final    Vit D, 25-Hydroxy 09/26/2017 28* 30 - 96 ng/mL Final    WBC 09/26/2017 8.38  3.90 - 12.70 K/uL Final    RBC 09/26/2017 5.07  4.60 - 6.20 M/uL Final    Hemoglobin 09/26/2017 13.7* 14.0 - 18.0 g/dL Final    Hematocrit 09/26/2017 42.9  40.0 - 54.0 % Final    MCV 09/26/2017 85  82 - 98 fL Final    MCH 09/26/2017 27.0  27.0 - 31.0 pg Final    MCHC 09/26/2017 31.9* 32.0 - 36.0 g/dL Final    RDW 09/26/2017 14.4  11.5 - 14.5 % Final    Platelets 09/26/2017 301  150 - 350 K/uL Final    MPV 09/26/2017 10.8  9.2 - 12.9 fL Final    Gran # 09/26/2017 4.6  1.8 - 7.7 K/uL Final    Lymph # 09/26/2017 2.2  1.0 - 4.8 K/uL Final    Mono # 09/26/2017 0.8  0.3 - 1.0 K/uL Final    Eos # 09/26/2017 0.8* 0.0 - 0.5 K/uL Final    Baso # 09/26/2017 0.08  0.00 - 0.20 K/uL Final    Gran% 09/26/2017 54.5  38.0 - 73.0 % Final    Lymph% 09/26/2017 25.8  18.0 - 48.0 % Final    Mono% 09/26/2017 9.3  4.0 - 15.0 % Final    Eosinophil% 09/26/2017 9.3* 0.0 - 8.0 % Final    Basophil% 09/26/2017 1.0  0.0 - 1.9 % Final    Differential Method 09/26/2017 Automated   Final        Assessment:     1. Type 2 diabetes mellitus with diabetic polyneuropathy, without long-term current use of insulin     2. Diabetic peripheral neuropathy associated with type 2 diabetes mellitus     3. Hypertension associated with diabetes     4. Hyperlipidemia associated with type 2 diabetes mellitus     5. Vitamin D deficiency     6. Hypogonadism, male          Plan:      Discussed general issues about diabetes pathophysiology and management.  Encouraged aerobic exercise.  Discussed foot care.  To see Endocrinology next week; wishes to discuss discontinuing some medication.    Flu vaccine today; reminded to get Zostavax.

## 2017-10-02 NOTE — PATIENT INSTRUCTIONS
Your test results will be communicated to you via : [facility], Telephone or Letter.   If you have not received your test results in one week, please contact the clinic at [phone].

## 2017-10-02 NOTE — PROGRESS NOTES
Two patient identifiers verified. Allergies reviewed.  FLU Vaccine IM administered to Left deltoid per MD order. Patient tolerated injection well: no redness, bleeding, or bruising noted to injection site. Patient instructed to remain in clinic setting for 15 minutes.

## 2017-10-09 ENCOUNTER — OFFICE VISIT (OUTPATIENT)
Dept: ENDOCRINOLOGY | Facility: CLINIC | Age: 62
End: 2017-10-09
Payer: COMMERCIAL

## 2017-10-09 VITALS
DIASTOLIC BLOOD PRESSURE: 72 MMHG | BODY MASS INDEX: 31.59 KG/M2 | HEART RATE: 91 BPM | SYSTOLIC BLOOD PRESSURE: 104 MMHG | HEIGHT: 74 IN | WEIGHT: 246.13 LBS

## 2017-10-09 DIAGNOSIS — E11.42 TYPE 2 DIABETES MELLITUS WITH DIABETIC POLYNEUROPATHY, WITHOUT LONG-TERM CURRENT USE OF INSULIN: Primary | ICD-10-CM

## 2017-10-09 DIAGNOSIS — Z79.4 TYPE 2 DIABETES MELLITUS WITHOUT COMPLICATION, WITH LONG-TERM CURRENT USE OF INSULIN: ICD-10-CM

## 2017-10-09 DIAGNOSIS — E29.1 HYPOGONADISM, MALE: ICD-10-CM

## 2017-10-09 DIAGNOSIS — E11.9 TYPE 2 DIABETES MELLITUS WITHOUT COMPLICATION, WITH LONG-TERM CURRENT USE OF INSULIN: ICD-10-CM

## 2017-10-09 DIAGNOSIS — E11.69 HYPERLIPIDEMIA ASSOCIATED WITH TYPE 2 DIABETES MELLITUS: ICD-10-CM

## 2017-10-09 DIAGNOSIS — E66.9 OBESITY (BMI 30-39.9): ICD-10-CM

## 2017-10-09 DIAGNOSIS — E78.5 HYPERLIPIDEMIA ASSOCIATED WITH TYPE 2 DIABETES MELLITUS: ICD-10-CM

## 2017-10-09 DIAGNOSIS — E55.9 VITAMIN D DEFICIENCY: ICD-10-CM

## 2017-10-09 DIAGNOSIS — I15.2 HYPERTENSION ASSOCIATED WITH DIABETES: ICD-10-CM

## 2017-10-09 DIAGNOSIS — E11.59 HYPERTENSION ASSOCIATED WITH DIABETES: ICD-10-CM

## 2017-10-09 PROCEDURE — 99999 PR PBB SHADOW E&M-EST. PATIENT-LVL III: CPT | Mod: PBBFAC,,, | Performed by: NURSE PRACTITIONER

## 2017-10-09 PROCEDURE — 99214 OFFICE O/P EST MOD 30 MIN: CPT | Mod: S$GLB,,, | Performed by: NURSE PRACTITIONER

## 2017-10-09 RX ORDER — ERGOCALCIFEROL 1.25 MG/1
50000 CAPSULE ORAL
Qty: 12 CAPSULE | Refills: 1 | Status: SHIPPED | OUTPATIENT
Start: 2017-10-09 | End: 2018-07-24 | Stop reason: SDUPTHER

## 2017-10-09 RX ORDER — EZETIMIBE AND SIMVASTATIN 10; 40 MG/1; MG/1
1 TABLET ORAL NIGHTLY
Qty: 30 TABLET | Refills: 11 | Status: SHIPPED | OUTPATIENT
Start: 2017-10-09 | End: 2018-10-09 | Stop reason: SDUPTHER

## 2017-10-09 RX ORDER — PEN NEEDLE, DIABETIC 30 GX3/16"
NEEDLE, DISPOSABLE MISCELLANEOUS
Qty: 100 EACH | Refills: 11 | Status: SHIPPED | OUTPATIENT
Start: 2017-10-09 | End: 2018-11-12 | Stop reason: SDUPTHER

## 2017-10-09 RX ORDER — ERGOCALCIFEROL 1.25 MG/1
50000 CAPSULE ORAL
Qty: 12 CAPSULE | Refills: 1 | Status: SHIPPED | OUTPATIENT
Start: 2017-10-09 | End: 2017-10-09 | Stop reason: SDUPTHER

## 2017-10-09 RX ORDER — TESTOSTERONE 20.25 MG/1.25G
GEL TOPICAL
Qty: 150 G | Refills: 5 | Status: SHIPPED | OUTPATIENT
Start: 2017-10-09 | End: 2018-06-06 | Stop reason: SDUPTHER

## 2017-10-09 RX ORDER — INSULIN LISPRO 100 [IU]/ML
INJECTION, SOLUTION INTRAVENOUS; SUBCUTANEOUS
Qty: 15 ML | Refills: 11 | Status: SHIPPED | OUTPATIENT
Start: 2017-10-09 | End: 2018-10-10 | Stop reason: SDUPTHER

## 2017-10-09 RX ORDER — INSULIN LISPRO 100 [IU]/ML
INJECTION, SOLUTION INTRAVENOUS; SUBCUTANEOUS
Qty: 15 ML | Refills: 3 | Status: CANCELLED | OUTPATIENT
Start: 2017-10-09

## 2017-10-09 RX ORDER — GLIMEPIRIDE 1 MG/1
TABLET ORAL
Qty: 90 TABLET | Refills: 0 | Status: CANCELLED | OUTPATIENT
Start: 2017-10-09

## 2017-10-09 RX ORDER — GLIMEPIRIDE 1 MG/1
1 TABLET ORAL
Qty: 30 TABLET | Refills: 11 | Status: SHIPPED | OUTPATIENT
Start: 2017-10-09 | End: 2018-10-09 | Stop reason: SDUPTHER

## 2017-10-09 RX ORDER — TESTOSTERONE 20.25 MG/1.25G
GEL TOPICAL
Qty: 150 G | Refills: 5 | Status: CANCELLED | OUTPATIENT
Start: 2017-10-09

## 2017-10-09 NOTE — PROGRESS NOTES
CC: Zhao Dodd arrives today for management of Type 2 DM and review of chronic medical conditions.      HPI: Mr. Zhao Dodd is a 62 y.o. White male who was diagnosed with Type 2 DM in ~ 2004 with s/s polyuria and polydipsia. He was admitted at the time of diagnosis. He was initially started on insulin therapy at diagnosis and has transitioned to orals and GLP1.     He is a patient of Dr. Perez. Seen last in August 2016. He presents today for his annual DM visit. He is new to me.  His T2DM is well controlled with an A1c of 7%.       DIABETES COMPLICATIONS: peripheral neuropathy     HOSPITALIZATIONS FOR DIABETES OR RELATED COMPLICATIONS:  Yes - at the time of diagnosis.     CURRENT A1C:    Hemoglobin A1C   Date Value Ref Range Status   09/26/2017 7.0 (H) 4.0 - 5.6 % Final     Comment:     According to ADA guidelines, hemoglobin A1c <7.0% represents  optimal control in non-pregnant diabetic patients. Different  metrics may apply to specific patient populations.   Standards of Medical Care in Diabetes-2016.  For the purpose of screening for the presence of diabetes:  <5.7%     Consistent with the absence of diabetes  5.7-6.4%  Consistent with increasing risk for diabetes   (prediabetes)  >or=6.5%  Consistent with diabetes  Currently, no consensus exists for use of hemoglobin A1c  for diagnosis of diabetes for children.  This Hemoglobin A1c assay has significant interference with fetal   hemoglobin   (HbF). The results are invalid for patients with abnormal amounts of   HbF,   including those with known Hereditary Persistence   of Fetal Hemoglobin. Heterozygous hemoglobin variants (HbAS, HbAC,   HbAD, HbAE, HbA2) do not significantly interfere with this assay;   however, presence of multiple variants in a sample may impact the %   interference.     01/26/2017 7.3 (H) 4.5 - 6.2 % Final     Comment:     According to ADA guidelines, hemoglobin A1C <7.0% represents  optimal control in non-pregnant diabetic  patients.  Different  metrics may apply to specific populations.   Standards of Medical Care in Diabetes - 2016.  For the purpose of screening for the presence of diabetes:  <5.7%     Consistent with the absence of diabetes  5.7-6.4%  Consistent with increasing risk for diabetes   (prediabetes)  >or=6.5%  Consistent with diabetes  Currently no consensus exists for use of hemoglobin A1C  for diagnosis of diabetes for children.     08/25/2016 7.0 (H) 4.5 - 6.2 % Final     Comment:     According to ADA guidelines, hemoglobin A1C <7.0% represents  optimal control in non-pregnant diabetic patients.  Different  metrics may apply to specific populations.   Standards of Medical Care in Diabetes - 2016.  For the purpose of screening for the presence of diabetes:  <5.7%     Consistent with the absence of diabetes  5.7-6.4%  Consistent with increasing risk for diabetes   (prediabetes)  >or=6.5%  Consistent with diabetes  Currently no consensus exists for use of hemoglobin A1C  for diagnosis of diabetes for children.         GOAL A1C: 7%    DM MEDICATIONS USED IN THE PAST: metformin 1 gm bid  Amaryl 1 mg (lunch)-no lows since moving to lunch   bydureon Weekly - want to use pen form   invokana 300 mg-tolerating well (started 11/2014)    CURRENT DIABETIC MEDS: Metformin 1000 mg BID, Amaryl 1 mg daily with dinner, Bydureon 2 mg weekly on Sunday, and Invokana 300 mg daily. Humalog SS as needed- taken every 7-10 days.   Uses Vials or Pens: N/A      STANDARDS of CARE:  Statin:  Yes - Vytorin   ACEI or ARB:  Yes - Lisinopril   ASA:  Yes - 81 mg   Last eye exam July 2017.   Last Podiatry Exam: None  Microalbumin/Creatinine ratio:  Lab Results   Component Value Date    MICALBCREAT 4.9 09/26/2017       BG readings are checked 3-4x/day. He did not bring his meter or logs today. He brought them to his PCP.  Per oral recall  FBG- 120-130's  SHENA- 130's  DIN- 130's   Bedtime- 130-140's  His meter will tell him average weekly- yesterday his  "average ~ 133.   Highest readings are at bedtime.      Hypoglycemia: Rarely.     Skipped/missed glycemic medications: No    Prandial injections taken with meals: N/A     Physical Activity: walking daily in the AM[place]     Skipping meals and/or snacking: eating 3 meals per day. Rarely snacking.   Drinking at least 4 bottles of water per day, Diet Dr. Pepper, and green tea with lunch and dinner.       OCCUPATION: part time- [employer]- [employer].     MEDICATIONS, ALLERGIES, LABS, VS's, MEDICAL, SURGICAL, SOCIAL, AND FAMILY HISTORY reviewed and updated in the appropriate sections during this encounter    ROS  Constitutional: + weight loss- ~ 12# under 250 now.   Endocrine:  Denies polyuria, polydipsia, intermittent nocturia- 1x.  HENT: Denies neck swelling, lumps, hoarseness or trouble swallowing. Denies any personal or family history of thyroid cancer.    Eyes: Negative for visual disturbance. Cataract surgery 2 years ago.   Respiratory: Negative for cough or shortness of breath.  Cardiovascular: Negative for chest pain or claudication.   Gastrointestinal: Negative for nausea, diarrhea, vomiting, bloating.  No history of pancreatitis or gastroparesis.  Genitourinary: Negative for urgency, frequency, frequent urinary tract infections.  Skin: Denies prolonged wound healing.  Neurological: Negative for syncope, numbness/burning of extremities- previously experienced has resolved.  Psychiatric/Behavioral: Negative for depression or anxiety  MS: 3 steroid injections over the summer 2017 to his back.       Vital Signs  /72   Pulse 91   Ht 6' 2" (1.88 m)   Wt 111.6 kg (246 lb 1.6 oz)   BMI 31.60 kg/m²         Chemistry        Component Value Date/Time     09/26/2017 0726    K 4.2 09/26/2017 0726     09/26/2017 0726    CO2 26 09/26/2017 0726    BUN 16 09/26/2017 0726    CREATININE 0.9 09/26/2017 0726     (H) 09/26/2017 0726      "   Component Value Date/Time    CALCIUM 9.2 09/26/2017 0726    ALKPHOS 58 09/26/2017 0726    AST 18 09/26/2017 0726    ALT 9 (L) 09/26/2017 0726    BILITOT 0.3 09/26/2017 0726    ESTGFRAFRICA >60.0 09/26/2017 0726    EGFRNONAA >60.0 09/26/2017 0726          Lab Results   Component Value Date    CHOL 137 09/26/2017    CHOL 121 08/25/2016    CHOL 120 05/06/2016     Lab Results   Component Value Date    HDL 40 09/26/2017    HDL 38 (L) 08/25/2016    HDL 39 (L) 05/06/2016     Lab Results   Component Value Date    LDLCALC 57.2 (L) 09/26/2017    LDLCALC 63.6 08/25/2016    LDLCALC 60.2 (L) 05/06/2016     Lab Results   Component Value Date    TRIG 199 (H) 09/26/2017    TRIG 97 08/25/2016    TRIG 104 05/06/2016     Lab Results   Component Value Date    CHOLHDL 29.2 09/26/2017    CHOLHDL 31.4 08/25/2016    CHOLHDL 32.5 05/06/2016       Lab Results   Component Value Date    TSH 3.302 01/14/2014       Lab Results   Component Value Date    MICALBCREAT 4.9 09/26/2017       Vit D, 25-Hydroxy   Date Value Ref Range Status   09/26/2017 28 (L) 30 - 96 ng/mL Final     Comment:     Vitamin D deficiency.........<10 ng/mL                              Vitamin D insufficiency......10-29 ng/mL       Vitamin D sufficiency........> or equal to 30 ng/mL  Vitamin D toxicity............>100 ng/mL           PHYSICAL EXAMINATION  Constitutional: Well developed, well nourished, NAD.   Psych: AAOx3, appropriate mood and affect, pleasant expression, conversant, appears relaxed, well groomed.   Eyes: Conjunctiva and corneas clear.  Neck: Supple, trachea midline, FROM, no thyromegaly or nodules, carotid pulses strong, no bruits.  Respiratory: Resp even and unlabored, CTA bilateral.  Cardiac: RRR, S1, S2 heard, no murmurs; radial pulses +2 bilaterally.   Abdomen: Soft, non-tender, non-distended; +BSs x  4.  Vascular: Same temperature peripherally to centrally, DP pulses +2 bilaterally, no edema.   Neuro: Gait steady.   Skin: Normal skin turgor. Skin warm  and dry. No acanthosis nigracans observed. Injection sites with no complications.   Feet: . Footwear appropriate, nails in good condition.      Assessment/Plan    1. Type 2 diabetes mellitus with diabetic polyneuropathy, without long-term current use of insulin   Controlled, A1c at 7%  Discussed diagnosis of DM, progression of disease, long term complications. He is not interested in changing his current treatment regimen.     Reviewed A1c and BG goals.     Continue Metformin 1000 mg BID, Amaryl 1 mg daily with dinner, Bydureon 2 mg weekly on Sunday, and Invokana 300 mg daily. Humalog SS as needed.      Continue to monitor BG 3-4x/day. Bring meter to clinic visits.     Review hypoglycemia s.s and treatment.     Notify clinic of any hypoglycemia episodes with BG readings < 70, if BG readings consistently run <80 or > 200, or for any BG concerns.     He prefers to follow up yearly. Notify clinic if another steroid injection is given.        2. Hypertension associated with diabetes - BP goal < 140/90. BP at goal, continue medications.          3. Hyperlipidemia associated with type 2 diabetes mellitus - LDL goal < 100. LDL at goal. LFTs WNL. Continue statin and fish oil. Triglycerides are slightly above goal. Discussed diet.            4. Obesity (BMI 30-39.9) - Body mass index is 31.6 kg/m².  Increases insulin resistance. Discussed diet and exercise.   Encouraged to continue to walk daily       5. Vitamin D deficiency - Vitamin D level is slightly below goal. Change to Ergo 50,000 weekly x 4 weeks and then monthly.          6. Hypogonadism, male - on Androgel and Cialis. Testosterone level WNL.               FOLLOW UP  Return in about 1 year (around 10/9/2018).

## 2017-10-09 NOTE — Clinical Note
Good morning Dr. Chavez,  I saw Mr. Dodd this morning. He preferred staying on his current regimen as his diabetes is well controlled and he is not having SE to medications.  Thanks,  LEANDRO Siddiqui-C Yes

## 2017-10-28 DIAGNOSIS — Z79.4 TYPE 2 DIABETES MELLITUS WITHOUT COMPLICATION, WITH LONG-TERM CURRENT USE OF INSULIN: ICD-10-CM

## 2017-10-28 DIAGNOSIS — E11.9 TYPE 2 DIABETES MELLITUS WITHOUT COMPLICATION, WITH LONG-TERM CURRENT USE OF INSULIN: ICD-10-CM

## 2017-10-30 RX ORDER — METFORMIN HYDROCHLORIDE 1000 MG/1
TABLET ORAL
Qty: 180 TABLET | Refills: 3 | Status: SHIPPED | OUTPATIENT
Start: 2017-10-30 | End: 2018-11-06 | Stop reason: SDUPTHER

## 2017-11-16 ENCOUNTER — PATIENT MESSAGE (OUTPATIENT)
Dept: ENDOCRINOLOGY | Facility: CLINIC | Age: 62
End: 2017-11-16

## 2017-11-29 DIAGNOSIS — I10 ESSENTIAL HYPERTENSION: ICD-10-CM

## 2017-11-29 RX ORDER — LISINOPRIL 10 MG/1
TABLET ORAL
Qty: 90 TABLET | Refills: 3 | Status: SHIPPED | OUTPATIENT
Start: 2017-11-29 | End: 2018-11-06 | Stop reason: SDUPTHER

## 2017-11-30 ENCOUNTER — PATIENT MESSAGE (OUTPATIENT)
Dept: ENDOCRINOLOGY | Facility: CLINIC | Age: 62
End: 2017-11-30

## 2017-12-01 ENCOUNTER — PATIENT MESSAGE (OUTPATIENT)
Dept: ENDOCRINOLOGY | Facility: CLINIC | Age: 62
End: 2017-12-01

## 2018-01-23 RX ORDER — EZETIMIBE AND SIMVASTATIN 10; 40 MG/1; MG/1
TABLET ORAL
Qty: 30 TABLET | Refills: 0 | OUTPATIENT
Start: 2018-01-23

## 2018-01-29 ENCOUNTER — PATIENT MESSAGE (OUTPATIENT)
Dept: ENDOCRINOLOGY | Facility: CLINIC | Age: 63
End: 2018-01-29

## 2018-02-11 RX ORDER — BLOOD-GLUCOSE METER
EACH MISCELLANEOUS
Qty: 100 STRIP | Refills: 1 | Status: SHIPPED | OUTPATIENT
Start: 2018-02-11 | End: 2018-04-02 | Stop reason: SDUPTHER

## 2018-03-05 ENCOUNTER — PATIENT MESSAGE (OUTPATIENT)
Dept: ENDOCRINOLOGY | Facility: CLINIC | Age: 63
End: 2018-03-05

## 2018-03-19 ENCOUNTER — PATIENT MESSAGE (OUTPATIENT)
Dept: ENDOCRINOLOGY | Facility: CLINIC | Age: 63
End: 2018-03-19

## 2018-03-19 ENCOUNTER — TELEPHONE (OUTPATIENT)
Dept: FAMILY MEDICINE | Facility: CLINIC | Age: 63
End: 2018-03-19

## 2018-03-19 DIAGNOSIS — I15.2 HYPERTENSION ASSOCIATED WITH DIABETES: ICD-10-CM

## 2018-03-19 DIAGNOSIS — E11.59 HYPERTENSION ASSOCIATED WITH DIABETES: ICD-10-CM

## 2018-03-19 DIAGNOSIS — E11.42 TYPE 2 DIABETES MELLITUS WITH DIABETIC POLYNEUROPATHY, WITHOUT LONG-TERM CURRENT USE OF INSULIN: Primary | ICD-10-CM

## 2018-03-19 DIAGNOSIS — E55.9 VITAMIN D DEFICIENCY: ICD-10-CM

## 2018-03-19 NOTE — TELEPHONE ENCOUNTER
----- Message from Chula Epps sent at 3/19/2018  1:15 PM CDT -----  Pt would like to have labs before his 04/09/18 appt

## 2018-04-02 ENCOUNTER — LAB VISIT (OUTPATIENT)
Dept: LAB | Facility: HOSPITAL | Age: 63
End: 2018-04-02
Attending: FAMILY MEDICINE
Payer: COMMERCIAL

## 2018-04-02 DIAGNOSIS — E11.42 TYPE 2 DIABETES MELLITUS WITH DIABETIC POLYNEUROPATHY, WITHOUT LONG-TERM CURRENT USE OF INSULIN: ICD-10-CM

## 2018-04-02 DIAGNOSIS — E11.59 HYPERTENSION ASSOCIATED WITH DIABETES: ICD-10-CM

## 2018-04-02 DIAGNOSIS — I15.2 HYPERTENSION ASSOCIATED WITH DIABETES: ICD-10-CM

## 2018-04-02 DIAGNOSIS — E55.9 VITAMIN D DEFICIENCY: ICD-10-CM

## 2018-04-02 LAB
25(OH)D3+25(OH)D2 SERPL-MCNC: 20 NG/ML
ANION GAP SERPL CALC-SCNC: 8 MMOL/L
BUN SERPL-MCNC: 20 MG/DL
CALCIUM SERPL-MCNC: 9.5 MG/DL
CHLORIDE SERPL-SCNC: 106 MMOL/L
CO2 SERPL-SCNC: 24 MMOL/L
CREAT SERPL-MCNC: 1 MG/DL
EST. GFR  (AFRICAN AMERICAN): >60 ML/MIN/1.73 M^2
EST. GFR  (NON AFRICAN AMERICAN): >60 ML/MIN/1.73 M^2
ESTIMATED AVG GLUCOSE: 151 MG/DL
GLUCOSE SERPL-MCNC: 140 MG/DL
HBA1C MFR BLD HPLC: 6.9 %
POTASSIUM SERPL-SCNC: 4.4 MMOL/L
SODIUM SERPL-SCNC: 138 MMOL/L

## 2018-04-02 PROCEDURE — 83036 HEMOGLOBIN GLYCOSYLATED A1C: CPT

## 2018-04-02 PROCEDURE — 80048 BASIC METABOLIC PNL TOTAL CA: CPT

## 2018-04-02 PROCEDURE — 82306 VITAMIN D 25 HYDROXY: CPT

## 2018-04-02 PROCEDURE — 36415 COLL VENOUS BLD VENIPUNCTURE: CPT | Mod: PO

## 2018-04-02 RX ORDER — BLOOD-GLUCOSE METER
EACH MISCELLANEOUS
Qty: 100 STRIP | Refills: 0 | Status: SHIPPED | OUTPATIENT
Start: 2018-04-02 | End: 2018-04-28 | Stop reason: SDUPTHER

## 2018-04-09 ENCOUNTER — OFFICE VISIT (OUTPATIENT)
Dept: FAMILY MEDICINE | Facility: CLINIC | Age: 63
End: 2018-04-09
Attending: FAMILY MEDICINE
Payer: COMMERCIAL

## 2018-04-09 VITALS
HEIGHT: 74 IN | BODY MASS INDEX: 31.44 KG/M2 | DIASTOLIC BLOOD PRESSURE: 80 MMHG | HEART RATE: 98 BPM | WEIGHT: 245 LBS | OXYGEN SATURATION: 96 % | RESPIRATION RATE: 16 BRPM | SYSTOLIC BLOOD PRESSURE: 124 MMHG

## 2018-04-09 DIAGNOSIS — E29.1 HYPOGONADISM, MALE: ICD-10-CM

## 2018-04-09 DIAGNOSIS — I15.2 HYPERTENSION ASSOCIATED WITH DIABETES: ICD-10-CM

## 2018-04-09 DIAGNOSIS — E11.42 TYPE 2 DIABETES MELLITUS WITH DIABETIC POLYNEUROPATHY, WITHOUT LONG-TERM CURRENT USE OF INSULIN: Primary | ICD-10-CM

## 2018-04-09 DIAGNOSIS — E11.69 HYPERLIPIDEMIA ASSOCIATED WITH TYPE 2 DIABETES MELLITUS: ICD-10-CM

## 2018-04-09 DIAGNOSIS — E55.9 VITAMIN D DEFICIENCY: ICD-10-CM

## 2018-04-09 DIAGNOSIS — E11.42 DIABETIC PERIPHERAL NEUROPATHY ASSOCIATED WITH TYPE 2 DIABETES MELLITUS: ICD-10-CM

## 2018-04-09 DIAGNOSIS — E78.5 HYPERLIPIDEMIA ASSOCIATED WITH TYPE 2 DIABETES MELLITUS: ICD-10-CM

## 2018-04-09 DIAGNOSIS — E11.59 HYPERTENSION ASSOCIATED WITH DIABETES: ICD-10-CM

## 2018-04-09 PROCEDURE — 3044F HG A1C LEVEL LT 7.0%: CPT | Mod: CPTII,S$GLB,, | Performed by: FAMILY MEDICINE

## 2018-04-09 PROCEDURE — 99214 OFFICE O/P EST MOD 30 MIN: CPT | Mod: S$GLB,,, | Performed by: FAMILY MEDICINE

## 2018-04-09 PROCEDURE — 3079F DIAST BP 80-89 MM HG: CPT | Mod: CPTII,S$GLB,, | Performed by: FAMILY MEDICINE

## 2018-04-09 PROCEDURE — 99999 PR PBB SHADOW E&M-EST. PATIENT-LVL III: CPT | Mod: PBBFAC,,, | Performed by: FAMILY MEDICINE

## 2018-04-09 PROCEDURE — 3074F SYST BP LT 130 MM HG: CPT | Mod: CPTII,S$GLB,, | Performed by: FAMILY MEDICINE

## 2018-04-09 NOTE — PROGRESS NOTES
"Subjective:       Patient ID: Zhao Dodd is a 63 y.o. male.    Chief Complaint: Diabetes     Diabetes    He presents for his follow-up diabetic visit. He has type 2 diabetes mellitus. There are no hypoglycemic associated symptoms. Pertinent negatives for hypoglycemia include no confusion, dizziness, headaches or nervousness/anxiousness. There are no diabetic associated symptoms. Pertinent negatives for diabetes include no chest pain, no fatigue, no polydipsia, no polyuria and no weakness. There are no hypoglycemic complications. Diabetic complications include peripheral neuropathy. Risk factors for coronary artery disease include male sex, obesity, hypertension, dyslipidemia and diabetes mellitus. Current diabetic treatment includes insulin injections and oral agent (triple therapy). He is compliant with treatment most of the time. He is following a diabetic diet. Meal planning includes carbohydrate counting. He participates in exercise three times a week. There is no change in his home blood glucose trend. An ACE inhibitor/angiotensin II receptor blocker is being taken.  Eye exam is current.       Patient Active Problem List   Diagnosis    Diabetic peripheral neuropathy associated with type 2 diabetes mellitus    Hypertension associated with diabetes    Hyperlipidemia associated with type 2 diabetes mellitus    Hypogonadism, male    AR (allergic rhinitis)    Vitamin D deficiency    Obesity (BMI 30-39.9)    Type 2 diabetes mellitus with diabetic polyneuropathy       Current Outpatient Prescriptions:     aspirin 81 mg Tab, Take 1 tablet by mouth., Disp: , Rfl:     BD INSULIN PEN NEEDLE UF MINI 31 gauge x 3/16" Ndle, , Disp: , Rfl: 0    canagliflozin (INVOKANA) 300 mg Tab tablet, Take 1 tablet (300 mg total) by mouth once daily., Disp: 30 tablet, Rfl: 11    ergocalciferol (ERGOCALCIFEROL) 50,000 unit Cap, Take 1 capsule (50,000 Units total) by mouth every 7 days. Weekly for 4 weeks and then monthly, " "Disp: 12 capsule, Rfl: 1    exenatide microspheres (BYDUREON) 2 mg/0.65 mL PnIj, Inject 2 mg into the skin every 7 days., Disp: 4 each, Rfl: 11    ezetimibe-simvastatin 10-40 mg (VYTORIN) 10-40 mg per tablet, Take 1 tablet by mouth nightly., Disp: 30 tablet, Rfl: 11    fexofenadine (ALLEGRA) 180 MG tablet, Take 1 tablet (180 mg total) by mouth once daily., Disp: 30 tablet, Rfl: 2    GEL-SELWYN 0.4 % Gel, As directed, Disp: , Rfl: 0    glimepiride (AMARYL) 1 MG tablet, Take 1 tablet (1 mg total) by mouth daily with dinner or evening meal., Disp: 30 tablet, Rfl: 11    insulin lispro (HUMALOG KWIKPEN) 100 unit/mL InPn pen, Sliding scale as needed., Disp: 15 mL, Rfl: 11    lisinopril 10 MG tablet, TAKE 1 TABLET(10 MG) BY MOUTH EVERY DAY, Disp: 90 tablet, Rfl: 3    LYRICA 150 mg capsule, Take 1 capsule by mouth 2 (two) times daily., Disp: , Rfl: 5    meloxicam (MOBIC) 15 MG tablet, Take 1 tablet by mouth once daily., Disp: , Rfl: 5    metFORMIN (GLUCOPHAGE) 1000 MG tablet, TAKE 1 TABLET(1000 MG) BY MOUTH TWICE DAILY WITH MEALS, Disp: 180 tablet, Rfl: 3    omega-3 fatty acids-vitamin E (FISH OIL) 1,000 mg Cap, Take 1,000 mg by mouth Twice daily., Disp: , Rfl:     ONETOUCH VERIO Strp, TEST BLOOD SUGAR THREE TIMES DAILY, Disp: 100 strip, Rfl: 0    pen needle, diabetic (BD INSULIN PEN NEEDLE UF MINI) 31 gauge x 3/16" Ndle, USE WITH HUMALOG PEN, Disp: 100 each, Rfl: 11    tadalafil (CIALIS) 10 MG tablet, TAKE 1 TABLET BY MOUTH AS NEEDED, Disp: 10 tablet, Rfl: 5    testosterone (ANDROGEL) 20.25 mg/1.25 gram (1.62 %) GlPm, 2 pumps daily, Disp: 150 g, Rfl: 5    triamcinolone acetonide 0.1% (KENALOG) 0.1 % cream, APPLY TO AFFECTED AREA BID FOR 10 TO 14 DAYS, Disp: , Rfl: 2    The following portions of the patient's history were reviewed and updated as appropriate: allergies, past family history, past medical history, past social history and past surgical history    Review of Systems   Constitutional: Negative for " activity change, fatigue and unexpected weight change.   HENT: Negative for ear discharge, ear pain, hearing loss, rhinorrhea, tinnitus, trouble swallowing and voice change.    Eyes: Negative for pain, discharge and visual disturbance.   Respiratory: Negative for cough, chest tightness, shortness of breath and wheezing.    Cardiovascular: Negative for chest pain, palpitations and leg swelling.   Gastrointestinal: Negative for abdominal pain, blood in stool, constipation, diarrhea, nausea and vomiting.   Endocrine: Negative for polydipsia and polyuria.   Genitourinary: Negative for decreased urine volume, difficulty urinating, dysuria, enuresis, frequency, hematuria and urgency.   Musculoskeletal: Negative for arthralgias, back pain, joint swelling, myalgias and neck pain.   Skin: Negative for rash.   Neurological: Negative for dizziness, weakness, light-headedness and headaches.   Hematological: Does not bruise/bleed easily.   Psychiatric/Behavioral: Negative for confusion, dysphoric mood and sleep disturbance. The patient is not nervous/anxious.          Objective:      Physical Exam   Constitutional: He is oriented to person, place, and time. He appears well-developed and well-nourished. He is cooperative.   HENT:   Head: Normocephalic and atraumatic.   Right Ear: External ear normal.   Left Ear: External ear normal.   Nose: Nose normal.   Mouth/Throat: Oropharynx is clear and moist and mucous membranes are normal. No oropharyngeal exudate.   Eyes: Conjunctivae are normal. No scleral icterus.   Neck: Neck supple. No JVD present. Carotid bruit is not present. No thyromegaly present.   Cardiovascular: Normal rate, regular rhythm, normal heart sounds and normal pulses.  Exam reveals no gallop and no friction rub.    No murmur heard.  Pulmonary/Chest: Effort normal and breath sounds normal. He has no wheezes. He has no rhonchi. He has no rales.   Abdominal: Soft. Bowel sounds are normal. He exhibits no distension and  "no mass. There is no splenomegaly or hepatomegaly. There is no tenderness.   Musculoskeletal: Normal range of motion. He exhibits no edema or tenderness.   Lymphadenopathy:     He has no cervical adenopathy.     He has no axillary adenopathy.   Neurological: He is alert and oriented to person, place, and time. He has normal strength and normal reflexes. No cranial nerve deficit or sensory deficit. Coordination normal.   Skin: Skin is warm and dry.   Psychiatric: He has a normal mood and affect.   Vitals reviewed.        Assessment:       1. Type 2 diabetes mellitus with diabetic polyneuropathy, without long-term current use of insulin    2. Diabetic peripheral neuropathy associated with type 2 diabetes mellitus    3. Hypertension associated with diabetes    4. Hyperlipidemia associated with type 2 diabetes mellitus    5. Hypogonadism, male    6. Vitamin D deficiency          Plan:       Reviewed labs; continue present medications.  Health Maintenance up-to-date.  RTC 6 months.      "This note will not be shared with the patient."  "

## 2018-04-29 RX ORDER — BLOOD-GLUCOSE METER
EACH MISCELLANEOUS
Qty: 100 STRIP | Refills: 3 | Status: SHIPPED | OUTPATIENT
Start: 2018-04-29 | End: 2018-08-06 | Stop reason: SDUPTHER

## 2018-06-06 DIAGNOSIS — E29.1 HYPOGONADISM MALE: Primary | ICD-10-CM

## 2018-06-06 DIAGNOSIS — E11.42 TYPE 2 DIABETES MELLITUS WITH DIABETIC POLYNEUROPATHY, WITHOUT LONG-TERM CURRENT USE OF INSULIN: ICD-10-CM

## 2018-06-07 RX ORDER — TESTOSTERONE 16.2 MG/G
GEL TRANSDERMAL
Qty: 150 G | Refills: 3 | Status: SHIPPED | OUTPATIENT
Start: 2018-06-07 | End: 2018-09-25 | Stop reason: SDUPTHER

## 2018-07-24 ENCOUNTER — PATIENT MESSAGE (OUTPATIENT)
Dept: FAMILY MEDICINE | Facility: CLINIC | Age: 63
End: 2018-07-24

## 2018-07-24 DIAGNOSIS — E55.9 VITAMIN D DEFICIENCY: ICD-10-CM

## 2018-07-24 RX ORDER — ERGOCALCIFEROL 1.25 MG/1
50000 CAPSULE ORAL
Qty: 12 CAPSULE | Refills: 1 | Status: SHIPPED | OUTPATIENT
Start: 2018-07-24 | End: 2018-12-24 | Stop reason: SDUPTHER

## 2018-08-06 RX ORDER — BLOOD-GLUCOSE METER
EACH MISCELLANEOUS
Qty: 100 STRIP | Refills: 0 | Status: SHIPPED | OUTPATIENT
Start: 2018-08-06 | End: 2018-08-29 | Stop reason: SDUPTHER

## 2018-08-29 RX ORDER — BLOOD-GLUCOSE METER
EACH MISCELLANEOUS
Qty: 100 STRIP | Refills: 0 | Status: SHIPPED | OUTPATIENT
Start: 2018-08-29 | End: 2018-09-23 | Stop reason: SDUPTHER

## 2018-09-24 RX ORDER — BLOOD-GLUCOSE METER
EACH MISCELLANEOUS
Qty: 100 STRIP | Refills: 0 | Status: SHIPPED | OUTPATIENT
Start: 2018-09-24 | End: 2018-10-19 | Stop reason: SDUPTHER

## 2018-09-25 ENCOUNTER — TELEPHONE (OUTPATIENT)
Dept: ENDOCRINOLOGY | Facility: CLINIC | Age: 63
End: 2018-09-25

## 2018-09-25 DIAGNOSIS — E29.1 HYPOGONADISM MALE: ICD-10-CM

## 2018-09-25 DIAGNOSIS — E11.42 DIABETIC PERIPHERAL NEUROPATHY ASSOCIATED WITH TYPE 2 DIABETES MELLITUS: Primary | ICD-10-CM

## 2018-09-25 DIAGNOSIS — E29.1 HYPOGONADISM, MALE: ICD-10-CM

## 2018-09-25 DIAGNOSIS — E78.5 HYPERLIPIDEMIA ASSOCIATED WITH TYPE 2 DIABETES MELLITUS: ICD-10-CM

## 2018-09-25 DIAGNOSIS — E11.69 HYPERLIPIDEMIA ASSOCIATED WITH TYPE 2 DIABETES MELLITUS: ICD-10-CM

## 2018-09-25 RX ORDER — TESTOSTERONE 20.25 MG/1.25G
GEL TOPICAL
Qty: 150 G | Refills: 3 | Status: SHIPPED | OUTPATIENT
Start: 2018-09-25 | End: 2018-11-09

## 2018-09-25 NOTE — TELEPHONE ENCOUNTER
----- Message from Joanne Bustillos sent at 9/25/2018 10:29 AM CDT -----  Contact: Self [phone]  PT is requesting a call at [phone]. Add pt to wait list. Denied appointment with NP.

## 2018-10-08 DIAGNOSIS — E11.42 TYPE 2 DIABETES MELLITUS WITH DIABETIC POLYNEUROPATHY, WITHOUT LONG-TERM CURRENT USE OF INSULIN: ICD-10-CM

## 2018-10-08 RX ORDER — CANAGLIFLOZIN 300 MG/1
TABLET, FILM COATED ORAL
Qty: 30 TABLET | Refills: 0 | Status: CANCELLED | OUTPATIENT
Start: 2018-10-08

## 2018-10-09 ENCOUNTER — PATIENT MESSAGE (OUTPATIENT)
Dept: ENDOCRINOLOGY | Facility: CLINIC | Age: 63
End: 2018-10-09

## 2018-10-09 DIAGNOSIS — E11.69 HYPERLIPIDEMIA ASSOCIATED WITH TYPE 2 DIABETES MELLITUS: ICD-10-CM

## 2018-10-09 DIAGNOSIS — E78.5 HYPERLIPIDEMIA ASSOCIATED WITH TYPE 2 DIABETES MELLITUS: ICD-10-CM

## 2018-10-09 DIAGNOSIS — E11.42 TYPE 2 DIABETES MELLITUS WITH DIABETIC POLYNEUROPATHY, WITHOUT LONG-TERM CURRENT USE OF INSULIN: ICD-10-CM

## 2018-10-09 RX ORDER — GLIMEPIRIDE 1 MG/1
1 TABLET ORAL
Qty: 30 TABLET | Refills: 3 | Status: SHIPPED | OUTPATIENT
Start: 2018-10-09 | End: 2018-12-03

## 2018-10-09 RX ORDER — EZETIMIBE AND SIMVASTATIN 10; 40 MG/1; MG/1
1 TABLET ORAL NIGHTLY
Qty: 30 TABLET | Refills: 3 | Status: SHIPPED | OUTPATIENT
Start: 2018-10-09 | End: 2019-01-28 | Stop reason: SDUPTHER

## 2018-10-10 DIAGNOSIS — E11.42 TYPE 2 DIABETES MELLITUS WITH DIABETIC POLYNEUROPATHY, WITHOUT LONG-TERM CURRENT USE OF INSULIN: ICD-10-CM

## 2018-10-11 RX ORDER — INSULIN LISPRO 100 [IU]/ML
INJECTION, SOLUTION INTRAVENOUS; SUBCUTANEOUS
Qty: 15 ML | Refills: 0 | Status: SHIPPED | OUTPATIENT
Start: 2018-10-11 | End: 2018-11-12 | Stop reason: SDUPTHER

## 2018-10-11 NOTE — TELEPHONE ENCOUNTER
Ms. Longoria:  I have forwarded this prescription refill request to Dr. Perez, who is managing the patient's diabetes.

## 2018-10-19 RX ORDER — BLOOD-GLUCOSE METER
EACH MISCELLANEOUS
Qty: 100 STRIP | Refills: 0 | Status: SHIPPED | OUTPATIENT
Start: 2018-10-19 | End: 2018-11-20 | Stop reason: SDUPTHER

## 2018-10-24 ENCOUNTER — TELEPHONE (OUTPATIENT)
Dept: FAMILY MEDICINE | Facility: CLINIC | Age: 63
End: 2018-10-24

## 2018-10-24 NOTE — TELEPHONE ENCOUNTER
----- Message from Nicolasa Daniel sent at 10/24/2018 11:13 AM CDT -----  Contact: Pt  Name of Who is Calling: EKTA PETERSON [[id number]]    What is the request in detail: Pt has an appt on 11/08 and is requesting an order for blood work, please advise.    Can the clinic reply by [facility]: No     [facility] [phone]

## 2018-11-06 DIAGNOSIS — I10 ESSENTIAL HYPERTENSION: ICD-10-CM

## 2018-11-06 DIAGNOSIS — E11.9 TYPE 2 DIABETES MELLITUS WITHOUT COMPLICATION, WITH LONG-TERM CURRENT USE OF INSULIN: ICD-10-CM

## 2018-11-06 DIAGNOSIS — Z79.4 TYPE 2 DIABETES MELLITUS WITHOUT COMPLICATION, WITH LONG-TERM CURRENT USE OF INSULIN: ICD-10-CM

## 2018-11-06 DIAGNOSIS — E11.42 TYPE 2 DIABETES MELLITUS WITH DIABETIC POLYNEUROPATHY, WITHOUT LONG-TERM CURRENT USE OF INSULIN: ICD-10-CM

## 2018-11-06 DIAGNOSIS — E11.42 TYPE 2 DIABETES MELLITUS WITH DIABETIC POLYNEUROPATHY, WITHOUT LONG-TERM CURRENT USE OF INSULIN: Primary | ICD-10-CM

## 2018-11-06 RX ORDER — LISINOPRIL 10 MG/1
TABLET ORAL
Qty: 90 TABLET | Refills: 0 | Status: SHIPPED | OUTPATIENT
Start: 2018-11-06 | End: 2019-01-28 | Stop reason: SDUPTHER

## 2018-11-06 RX ORDER — METFORMIN HYDROCHLORIDE 1000 MG/1
TABLET ORAL
Qty: 180 TABLET | Refills: 0 | Status: SHIPPED | OUTPATIENT
Start: 2018-11-06 | End: 2018-12-03 | Stop reason: SDUPTHER

## 2018-11-08 ENCOUNTER — PATIENT MESSAGE (OUTPATIENT)
Dept: ENDOCRINOLOGY | Facility: CLINIC | Age: 63
End: 2018-11-08

## 2018-11-08 ENCOUNTER — TELEPHONE (OUTPATIENT)
Dept: ENDOCRINOLOGY | Facility: CLINIC | Age: 63
End: 2018-11-08

## 2018-11-08 DIAGNOSIS — E29.1 HYPOGONADISM, MALE: Primary | ICD-10-CM

## 2018-11-08 NOTE — TELEPHONE ENCOUNTER
Patient's insurance prefers Testim Gel vs Androgel.  Please send in new script to his pharmacy.  Last visit Oct 2017 but has a follow up visit in December

## 2018-11-09 DIAGNOSIS — E29.1 HYPOGONADISM IN MALE: Primary | ICD-10-CM

## 2018-11-09 RX ORDER — TESTOSTERONE 50 MG/5G
5 GEL TRANSDERMAL DAILY
Qty: 30 TUBE | Refills: 5 | Status: SHIPPED | OUTPATIENT
Start: 2018-11-09 | End: 2018-12-03 | Stop reason: SDUPTHER

## 2018-11-09 RX ORDER — TESTOSTERONE GEL, 1% 10 MG/G
5 GEL TRANSDERMAL DAILY
Qty: 30 PACKET | Refills: 5 | Status: SHIPPED | OUTPATIENT
Start: 2018-11-09 | End: 2018-11-09

## 2018-11-12 DIAGNOSIS — E11.42 TYPE 2 DIABETES MELLITUS WITH DIABETIC POLYNEUROPATHY, WITHOUT LONG-TERM CURRENT USE OF INSULIN: ICD-10-CM

## 2018-11-13 RX ORDER — INSULIN LISPRO 100 [IU]/ML
INJECTION, SOLUTION INTRAVENOUS; SUBCUTANEOUS
Qty: 15 ML | Refills: 1 | Status: SHIPPED | OUTPATIENT
Start: 2018-11-13 | End: 2018-11-20

## 2018-11-16 ENCOUNTER — LAB VISIT (OUTPATIENT)
Dept: LAB | Facility: HOSPITAL | Age: 63
End: 2018-11-16
Attending: INTERNAL MEDICINE
Payer: COMMERCIAL

## 2018-11-16 DIAGNOSIS — E11.42 DIABETIC PERIPHERAL NEUROPATHY ASSOCIATED WITH TYPE 2 DIABETES MELLITUS: ICD-10-CM

## 2018-11-16 DIAGNOSIS — E11.42 TYPE 2 DIABETES MELLITUS WITH DIABETIC POLYNEUROPATHY, WITHOUT LONG-TERM CURRENT USE OF INSULIN: ICD-10-CM

## 2018-11-16 DIAGNOSIS — E11.69 HYPERLIPIDEMIA ASSOCIATED WITH TYPE 2 DIABETES MELLITUS: ICD-10-CM

## 2018-11-16 DIAGNOSIS — E78.5 HYPERLIPIDEMIA ASSOCIATED WITH TYPE 2 DIABETES MELLITUS: ICD-10-CM

## 2018-11-16 DIAGNOSIS — E29.1 HYPOGONADISM, MALE: ICD-10-CM

## 2018-11-16 LAB
25(OH)D3+25(OH)D2 SERPL-MCNC: 28 NG/ML
ALBUMIN SERPL BCP-MCNC: 4.1 G/DL
ALP SERPL-CCNC: 56 U/L
ALT SERPL W/O P-5'-P-CCNC: 13 U/L
ANION GAP SERPL CALC-SCNC: 8 MMOL/L
AST SERPL-CCNC: 23 U/L
BASOPHILS # BLD AUTO: 0.13 K/UL
BASOPHILS NFR BLD: 1.9 %
BILIRUB SERPL-MCNC: 0.5 MG/DL
BUN SERPL-MCNC: 19 MG/DL
CALCIUM SERPL-MCNC: 9.3 MG/DL
CHLORIDE SERPL-SCNC: 101 MMOL/L
CHOLEST SERPL-MCNC: 135 MG/DL
CHOLEST/HDLC SERPL: 3.4 {RATIO}
CO2 SERPL-SCNC: 25 MMOL/L
CREAT SERPL-MCNC: 1 MG/DL
DIFFERENTIAL METHOD: ABNORMAL
EOSINOPHIL # BLD AUTO: 0.4 K/UL
EOSINOPHIL NFR BLD: 5.6 %
ERYTHROCYTE [DISTWIDTH] IN BLOOD BY AUTOMATED COUNT: 16.5 %
EST. GFR  (AFRICAN AMERICAN): >60 ML/MIN/1.73 M^2
EST. GFR  (NON AFRICAN AMERICAN): >60 ML/MIN/1.73 M^2
ESTIMATED AVG GLUCOSE: 151 MG/DL
GLUCOSE SERPL-MCNC: 150 MG/DL
HBA1C MFR BLD HPLC: 6.9 %
HCT VFR BLD AUTO: 41.6 %
HDLC SERPL-MCNC: 40 MG/DL
HDLC SERPL: 29.6 %
HGB BLD-MCNC: 13.1 G/DL
IMM GRANULOCYTES # BLD AUTO: 0.02 K/UL
IMM GRANULOCYTES NFR BLD AUTO: 0.3 %
LDLC SERPL CALC-MCNC: 62.8 MG/DL
LYMPHOCYTES # BLD AUTO: 1.8 K/UL
LYMPHOCYTES NFR BLD: 25.6 %
MCH RBC QN AUTO: 24.8 PG
MCHC RBC AUTO-ENTMCNC: 31.5 G/DL
MCV RBC AUTO: 79 FL
MONOCYTES # BLD AUTO: 0.7 K/UL
MONOCYTES NFR BLD: 10.5 %
NEUTROPHILS # BLD AUTO: 3.9 K/UL
NEUTROPHILS NFR BLD: 56.1 %
NONHDLC SERPL-MCNC: 95 MG/DL
NRBC BLD-RTO: 0 /100 WBC
PLATELET # BLD AUTO: 298 K/UL
PMV BLD AUTO: 11.3 FL
POTASSIUM SERPL-SCNC: 4.1 MMOL/L
PROT SERPL-MCNC: 6.9 G/DL
RBC # BLD AUTO: 5.28 M/UL
SODIUM SERPL-SCNC: 134 MMOL/L
TESTOST SERPL-MCNC: 338 NG/DL
TRIGL SERPL-MCNC: 161 MG/DL
VIT B12 SERPL-MCNC: 289 PG/ML
WBC # BLD AUTO: 6.95 K/UL

## 2018-11-16 PROCEDURE — 80053 COMPREHEN METABOLIC PANEL: CPT

## 2018-11-16 PROCEDURE — 83036 HEMOGLOBIN GLYCOSYLATED A1C: CPT

## 2018-11-16 PROCEDURE — 80061 LIPID PANEL: CPT

## 2018-11-16 PROCEDURE — 85025 COMPLETE CBC W/AUTO DIFF WBC: CPT

## 2018-11-16 PROCEDURE — 36415 COLL VENOUS BLD VENIPUNCTURE: CPT | Mod: PO

## 2018-11-16 PROCEDURE — 82607 VITAMIN B-12: CPT

## 2018-11-16 PROCEDURE — 82306 VITAMIN D 25 HYDROXY: CPT

## 2018-11-16 PROCEDURE — 84403 ASSAY OF TOTAL TESTOSTERONE: CPT

## 2018-11-16 RX ORDER — PEN NEEDLE, DIABETIC 30 GX3/16"
NEEDLE, DISPOSABLE MISCELLANEOUS
Qty: 100 EACH | Refills: 0 | Status: SHIPPED | OUTPATIENT
Start: 2018-11-16 | End: 2018-12-25 | Stop reason: SDUPTHER

## 2018-11-16 NOTE — TELEPHONE ENCOUNTER
Patient uses Humalog on a prn basis. Left vm for him to call back to let me know about how many units he uses in a day so I can let his pharmacy know.

## 2018-11-16 NOTE — TELEPHONE ENCOUNTER
----- Message from Joanne Bustillos sent at 11/16/2018 10:44 AM CST -----  Contact: Ace [phone]  ..Medication question / problems.  Pharmacy called for max daily units per day for insulin lispro (HUMALOG KWIKPEN INSULIN) 100 unit/mL InPn pen     ..  [facility] [id number] [place] [address] - [facility] [address] AT [facility] & [address]  [facility] [address]  [facility] [address]  Phone: [phone] Fax: [phone]

## 2018-11-16 NOTE — TELEPHONE ENCOUNTER
----- Message from Carmelita Gonzalez sent at 11/16/2018 10:53 AM CST -----  Contact:     Pt   [phone]  Pt  Returning  the nurse phone call

## 2018-11-20 ENCOUNTER — PATIENT MESSAGE (OUTPATIENT)
Dept: ENDOCRINOLOGY | Facility: CLINIC | Age: 63
End: 2018-11-20

## 2018-11-20 ENCOUNTER — OFFICE VISIT (OUTPATIENT)
Dept: FAMILY MEDICINE | Facility: CLINIC | Age: 63
End: 2018-11-20
Attending: FAMILY MEDICINE
Payer: COMMERCIAL

## 2018-11-20 VITALS
HEIGHT: 74 IN | HEART RATE: 98 BPM | WEIGHT: 250.5 LBS | DIASTOLIC BLOOD PRESSURE: 64 MMHG | SYSTOLIC BLOOD PRESSURE: 136 MMHG | OXYGEN SATURATION: 97 % | BODY MASS INDEX: 32.15 KG/M2

## 2018-11-20 DIAGNOSIS — E29.1 HYPOGONADISM, MALE: ICD-10-CM

## 2018-11-20 DIAGNOSIS — I15.2 HYPERTENSION ASSOCIATED WITH DIABETES: ICD-10-CM

## 2018-11-20 DIAGNOSIS — E11.59 HYPERTENSION ASSOCIATED WITH DIABETES: ICD-10-CM

## 2018-11-20 DIAGNOSIS — E11.69 HYPERLIPIDEMIA ASSOCIATED WITH TYPE 2 DIABETES MELLITUS: ICD-10-CM

## 2018-11-20 DIAGNOSIS — E11.42 TYPE 2 DIABETES MELLITUS WITH DIABETIC POLYNEUROPATHY, WITHOUT LONG-TERM CURRENT USE OF INSULIN: Primary | ICD-10-CM

## 2018-11-20 DIAGNOSIS — E55.9 VITAMIN D DEFICIENCY: ICD-10-CM

## 2018-11-20 DIAGNOSIS — E11.42 DIABETIC PERIPHERAL NEUROPATHY ASSOCIATED WITH TYPE 2 DIABETES MELLITUS: ICD-10-CM

## 2018-11-20 DIAGNOSIS — E78.5 HYPERLIPIDEMIA ASSOCIATED WITH TYPE 2 DIABETES MELLITUS: ICD-10-CM

## 2018-11-20 PROCEDURE — 90471 IMMUNIZATION ADMIN: CPT | Mod: S$GLB,,, | Performed by: FAMILY MEDICINE

## 2018-11-20 PROCEDURE — 3075F SYST BP GE 130 - 139MM HG: CPT | Mod: CPTII,S$GLB,, | Performed by: FAMILY MEDICINE

## 2018-11-20 PROCEDURE — 3008F BODY MASS INDEX DOCD: CPT | Mod: CPTII,S$GLB,, | Performed by: FAMILY MEDICINE

## 2018-11-20 PROCEDURE — 99214 OFFICE O/P EST MOD 30 MIN: CPT | Mod: 25,S$GLB,, | Performed by: FAMILY MEDICINE

## 2018-11-20 PROCEDURE — 99999 PR PBB SHADOW E&M-EST. PATIENT-LVL III: CPT | Mod: PBBFAC,,, | Performed by: FAMILY MEDICINE

## 2018-11-20 PROCEDURE — 90686 IIV4 VACC NO PRSV 0.5 ML IM: CPT | Mod: S$GLB,,, | Performed by: FAMILY MEDICINE

## 2018-11-20 PROCEDURE — 3044F HG A1C LEVEL LT 7.0%: CPT | Mod: CPTII,S$GLB,, | Performed by: FAMILY MEDICINE

## 2018-11-20 PROCEDURE — 3078F DIAST BP <80 MM HG: CPT | Mod: CPTII,S$GLB,, | Performed by: FAMILY MEDICINE

## 2018-11-20 RX ORDER — HYDROCODONE BITARTRATE AND ACETAMINOPHEN 7.5; 325 MG/1; MG/1
TABLET ORAL
Refills: 0 | Status: ON HOLD | COMMUNITY
Start: 2018-11-14 | End: 2019-03-13 | Stop reason: SDUPTHER

## 2018-11-20 RX ORDER — INSULIN LISPRO 100 [IU]/ML
INJECTION, SOLUTION INTRAVENOUS; SUBCUTANEOUS
Qty: 15 ML | Refills: 1 | Status: SHIPPED | OUTPATIENT
Start: 2018-11-20 | End: 2018-12-03

## 2018-11-20 RX ORDER — FENOPROFEN CALCIUM 200 MG/1
1 CAPSULE ORAL EVERY 8 HOURS PRN
COMMUNITY
Start: 2018-11-13 | End: 2019-07-07

## 2018-11-20 NOTE — PROGRESS NOTES
"Subjective:       Zhao Dodd is a 63 y.o. male who presents for follow up of diabetes.. Current symptoms include: none. Patient denies foot ulcerations, hyperglycemia, hypoglycemia , increased appetite, nausea, paresthesia of the feet, polydipsia, polyuria, visual disturbances, vomiting and weight loss. Evaluation to date has been: fasting lipid panel, hemoglobin A1C and microalbuminuria. Home sugars: BGs consistently in an acceptable range. Current treatments: no recent interventions. Last dilated eye exam 7/2018.    Patient Active Problem List   Diagnosis    Diabetic peripheral neuropathy associated with type 2 diabetes mellitus    Hypertension associated with diabetes    Hyperlipidemia associated with type 2 diabetes mellitus    Hypogonadism, male    AR (allergic rhinitis)    Vitamin D deficiency    Obesity (BMI 30-39.9)    Type 2 diabetes mellitus with diabetic polyneuropathy       Current Outpatient Medications:     aspirin 81 mg Tab, Take 1 tablet by mouth., Disp: , Rfl:     BD INSULIN PEN NEEDLE UF MINI 31 gauge x 3/16" Ndle, , Disp: , Rfl: 0    canagliflozin (INVOKANA) 300 mg Tab tablet, Take 1 tablet (300 mg total) by mouth once daily., Disp: 30 tablet, Rfl: 3    ergocalciferol (ERGOCALCIFEROL) 50,000 unit Cap, Take 1 capsule (50,000 Units total) by mouth every 7 days. Weekly for 4 weeks and then monthly, Disp: 12 capsule, Rfl: 1    exenatide microspheres (BYDUREON) 2 mg/0.65 mL PnIj, Inject 2 mg into the skin every 7 days., Disp: 4 each, Rfl: 2    ezetimibe-simvastatin 10-40 mg (VYTORIN) 10-40 mg per tablet, Take 1 tablet by mouth nightly., Disp: 30 tablet, Rfl: 3    fenoprofen 200 mg Cap, Take 1 capsule by mouth every 8 (eight) hours as needed., Disp: , Rfl:     GEL-SELWYN 0.4 % Gel, As directed, Disp: , Rfl: 0    glimepiride (AMARYL) 1 MG tablet, Take 1 tablet (1 mg total) by mouth daily with dinner or evening meal., Disp: 30 tablet, Rfl: 3    HYDROcodone-acetaminophen (NORCO) 7.5-325 " "mg per tablet, TK ONE T PO Q 6 TO 8 H PRN, Disp: , Rfl: 0    lisinopril 10 MG tablet, TAKE 1 TABLET BY MOUTH EVERY DAY, Disp: 90 tablet, Rfl: 0    LYRICA 150 mg capsule, Take 1 capsule by mouth 2 (two) times daily., Disp: , Rfl: 5    meloxicam (MOBIC) 15 MG tablet, Take 1 tablet by mouth once daily., Disp: , Rfl: 5    metFORMIN (GLUCOPHAGE) 1000 MG tablet, TAKE 1 TABLET BY MOUTH TWICE DAILY WITH MEALS, Disp: 180 tablet, Rfl: 0    omega-3 fatty acids-vitamin E (FISH OIL) 1,000 mg Cap, Take 1,000 mg by mouth Twice daily., Disp: , Rfl:     ONETOUCH VERIO Strp, TEST BLOOD SUGAR THREE TIMES DAILY AS DIRECTED, Disp: 100 strip, Rfl: 0    pen needle, diabetic (BD ULTRA-FINE MINI PEN NEEDLE) 31 gauge x 3/16" Ndle, USE WITH HUMALOG PEN, Disp: 100 each, Rfl: 0    tadalafil (CIALIS) 10 MG tablet, TAKE 1 TABLET BY MOUTH AS NEEDED, Disp: 10 tablet, Rfl: 5    testosterone (TESTIM) 50 mg/5 gram (1 %) Gel, Apply 5 g topically once daily., Disp: 30 Tube, Rfl: 5    triamcinolone acetonide 0.1% (KENALOG) 0.1 % cream, APPLY TO AFFECTED AREA BID FOR 10 TO 14 DAYS, Disp: , Rfl: 2    insulin lispro (HUMALOG KWIKPEN INSULIN) 100 unit/mL InPn pen, SLIDING SCALE AS NEEDED/up to 10 units with each meal, Disp: 15 mL, Rfl: 1    The following portions of the patient's history were reviewed and updated as appropriate: allergies, past family history, past medical history, past social history and past surgical history.    Review of Systems  Answers for HPI/ROS submitted by the patient on 11/14/2018   Diabetes problem  Diabetes type: type 2  MedicAlert ID: No  blurred vision: No  chest pain: No  fatigue: No  foot paresthesias: No  foot ulcerations: No  polydipsia: Yes  polyphagia: No  polyuria: No  visual change: No  weakness: No  weight loss: No  Symptom course: stable  confusion: No  dizziness: No  headaches: No  hunger: No  mood changes: No  nervous/anxious: No  pallor: No  seizures: No  sleepiness: No  speech difficulty: No  sweats: " "No  tremors: No  blackouts: No  hospitalization: No  nocturnal hypoglycemia: No  required assistance: No  required glucagon: No  CVA: No  heart disease: No  impotence: Yes  nephropathy: No  peripheral neuropathy: No  PVD: No  retinopathy: No  autonomic neuropathy: No  CAD risks: hypertension, obesity, diabetes mellitus, male sex  Current treatments: diet, insulin injections, oral agent (triple therapy)  Treatment compliance: all of the time  Dose schedule: at bedtime  Given by: patient  Injection sites: abdominal wall  Home blood tests: 3-4 x per day  Home urines: <1 x per month  Monitoring compliance: excellent  Blood glucose trend: fluctuating minimally  Weight trend: stable  Current diet: diabetic  Meal planning: avoidance of concentrated sweets  Exercise: daily  Dietitian visit: No  Eye exam current: Yes  Sees podiatrist: No       Objective:      /64 (BP Location: Left arm, Patient Position: Sitting, BP Method: Large (Manual))   Pulse 98   Ht 6' 2" (1.88 m)   Wt 113.6 kg (250 lb 8 oz)   SpO2 97%   BMI 32.16 kg/m²     General Appearance:    Alert, cooperative, no distress, appears stated age   Head:    Normocephalic, without obvious abnormality, atraumatic   Eyes:    PERRL, conjunctiva/corneas clear, EOM's intact, fundi     benign, both eyes        Ears:    Normal TM's and external ear canals, both ears   Nose:   Nares normal, septum midline, mucosa normal, no drainage    or sinus tenderness   Throat:   Lips, mucosa, and tongue normal; teeth and gums normal   Neck:   Supple, symmetrical, trachea midline, no adenopathy;        thyroid:  No enlargement/tenderness/nodules; no carotid    bruit or JVD   Back:     Symmetric, no curvature, ROM normal, no CVA tenderness   Lungs:     Clear to auscultation bilaterally, respirations unlabored   Chest wall:    No tenderness or deformity   Heart:    Regular rate and rhythm, S1 and S2 normal, no murmur, rub   or gallop   Abdomen:     Soft, non-tender, bowel sounds " active all four quadrants,     no masses, no organomegaly   Extremities:   Extremities normal, atraumatic, no cyanosis or edema   Pulses:   2+ and symmetric all extremities   Skin:   Skin color, texture, turgor normal, no rashes or lesions   Lymph nodes:   Cervical, supraclavicular, and axillary nodes normal   Neurologic:   CNII-XII intact. Normal strength, sensation and reflexes       throughout         Laboratory:    Component Date Value Ref Range    Microalbum.,U,Random 11/16/2018 6.0  ug/mL    Creatinine, Random Ur 11/16/2018 89.0  23.0 - 375.0 mg/dL    Microalb Creat Ratio 11/16/2018 6.7  0.0 - 30.0 ug/mg   Component Date Value Ref Range    Vit D, 25-Hydroxy 11/16/2018 28* 30 - 96 ng/mL    WBC 11/16/2018 6.95  3.90 - 12.70 K/uL    RBC 11/16/2018 5.28  4.60 - 6.20 M/uL    Hemoglobin 11/16/2018 13.1* 14.0 - 18.0 g/dL    Hematocrit 11/16/2018 41.6  40.0 - 54.0 %    MCV 11/16/2018 79* 82 - 98 fL    MCH 11/16/2018 24.8* 27.0 - 31.0 pg    MCHC 11/16/2018 31.5* 32.0 - 36.0 g/dL    RDW 11/16/2018 16.5* 11.5 - 14.5 %    Platelets 11/16/2018 298  150 - 350 K/uL    MPV 11/16/2018 11.3  9.2 - 12.9 fL    Immature Granulocytes 11/16/2018 0.3  0.0 - 0.5 %    Gran # (ANC) 11/16/2018 3.9  1.8 - 7.7 K/uL    Immature Grans (Abs) 11/16/2018 0.02  0.00 - 0.04 K/uL    Lymph # 11/16/2018 1.8  1.0 - 4.8 K/uL    Mono # 11/16/2018 0.7  0.3 - 1.0 K/uL    Eos # 11/16/2018 0.4  0.0 - 0.5 K/uL    Baso # 11/16/2018 0.13  0.00 - 0.20 K/uL    nRBC 11/16/2018 0  0 /100 WBC    Gran% 11/16/2018 56.1  38.0 - 73.0 %    Lymph% 11/16/2018 25.6  18.0 - 48.0 %    Mono% 11/16/2018 10.5  4.0 - 15.0 %    Eosinophil% 11/16/2018 5.6  0.0 - 8.0 %    Basophil% 11/16/2018 1.9  0.0 - 1.9 %    Hemoglobin A1C 11/16/2018 6.9* 4.0 - 5.6 %    Estimated Avg Glucose 11/16/2018 151* 68 - 131 mg/dL    Cholesterol 11/16/2018 135  120 - 199 mg/dL    Triglycerides 11/16/2018 161* 30 - 150 mg/dL    HDL 11/16/2018 40  40 - 75 mg/dL    LDL  Cholesterol 11/16/2018 62.8* 63.0 - 159.0 mg/dL    HDL/Chol Ratio 11/16/2018 29.6  20.0 - 50.0 %    Total Cholesterol/HDL Ratio 11/16/2018 3.4  2.0 - 5.0    Non-HDL Cholesterol 11/16/2018 95  mg/dL    Sodium 11/16/2018 134* 136 - 145 mmol/L    Potassium 11/16/2018 4.1  3.5 - 5.1 mmol/L    Chloride 11/16/2018 101  95 - 110 mmol/L    CO2 11/16/2018 25  23 - 29 mmol/L    Glucose 11/16/2018 150* 70 - 110 mg/dL    BUN, Bld 11/16/2018 19  8 - 23 mg/dL    Creatinine 11/16/2018 1.0  0.5 - 1.4 mg/dL    Calcium 11/16/2018 9.3  8.7 - 10.5 mg/dL    Total Protein 11/16/2018 6.9  6.0 - 8.4 g/dL    Albumin 11/16/2018 4.1  3.5 - 5.2 g/dL    Total Bilirubin 11/16/2018 0.5  0.1 - 1.0 mg/dL    Alkaline Phosphatase 11/16/2018 56  55 - 135 U/L    AST 11/16/2018 23  10 - 40 U/L    ALT 11/16/2018 13  10 - 44 U/L    Anion Gap 11/16/2018 8  8 - 16 mmol/L    eGFR if African American 11/16/2018 >60.0  >60 mL/min/1.73 m^2    eGFR if non African American 11/16/2018 >60.0  >60 mL/min/1.73 m^2    Testosterone, Total 11/16/2018 338  304 - 1227 ng/dL    Vitamin B-12 11/16/2018 289  210 - 950 pg/mL        Assessment:     1. Type 2 diabetes mellitus with diabetic polyneuropathy, without long-term current use of insulin     2. Hypertension associated with diabetes     3. Hyperlipidemia associated with type 2 diabetes mellitus     4. Hypogonadism, male     5. Diabetic peripheral neuropathy associated with type 2 diabetes mellitus     6. Vitamin D deficiency          Plan:     Continue medications.  Follow up in 6 months or as needed.

## 2018-11-20 NOTE — PROGRESS NOTES
Patient was given Influenza IM in Left Deltoid  as per orders from MD. Aseptic tech used and pt tolerated well. Pt was monitored for 15 mins with no reaction noted.

## 2018-11-21 ENCOUNTER — PATIENT MESSAGE (OUTPATIENT)
Dept: ENDOCRINOLOGY | Facility: CLINIC | Age: 63
End: 2018-11-21

## 2018-11-30 ENCOUNTER — PATIENT MESSAGE (OUTPATIENT)
Dept: FAMILY MEDICINE | Facility: CLINIC | Age: 63
End: 2018-11-30

## 2018-12-03 ENCOUNTER — OFFICE VISIT (OUTPATIENT)
Dept: ENDOCRINOLOGY | Facility: CLINIC | Age: 63
End: 2018-12-03
Payer: COMMERCIAL

## 2018-12-03 VITALS
SYSTOLIC BLOOD PRESSURE: 120 MMHG | HEIGHT: 74 IN | WEIGHT: 248.88 LBS | HEART RATE: 100 BPM | DIASTOLIC BLOOD PRESSURE: 76 MMHG | BODY MASS INDEX: 31.94 KG/M2

## 2018-12-03 DIAGNOSIS — E11.59 HYPERTENSION ASSOCIATED WITH DIABETES: ICD-10-CM

## 2018-12-03 DIAGNOSIS — E11.42 DIABETIC PERIPHERAL NEUROPATHY ASSOCIATED WITH TYPE 2 DIABETES MELLITUS: ICD-10-CM

## 2018-12-03 DIAGNOSIS — E11.69 HYPERLIPIDEMIA ASSOCIATED WITH TYPE 2 DIABETES MELLITUS: ICD-10-CM

## 2018-12-03 DIAGNOSIS — E29.1 HYPOGONADISM IN MALE: ICD-10-CM

## 2018-12-03 DIAGNOSIS — E29.1 HYPOGONADISM, MALE: ICD-10-CM

## 2018-12-03 DIAGNOSIS — Z79.4 TYPE 2 DIABETES MELLITUS WITHOUT COMPLICATION, WITH LONG-TERM CURRENT USE OF INSULIN: ICD-10-CM

## 2018-12-03 DIAGNOSIS — E11.42 TYPE 2 DIABETES MELLITUS WITH DIABETIC POLYNEUROPATHY, WITHOUT LONG-TERM CURRENT USE OF INSULIN: ICD-10-CM

## 2018-12-03 DIAGNOSIS — E78.5 HYPERLIPIDEMIA ASSOCIATED WITH TYPE 2 DIABETES MELLITUS: ICD-10-CM

## 2018-12-03 DIAGNOSIS — E11.9 TYPE 2 DIABETES MELLITUS WITHOUT COMPLICATION, WITH LONG-TERM CURRENT USE OF INSULIN: ICD-10-CM

## 2018-12-03 DIAGNOSIS — I15.2 HYPERTENSION ASSOCIATED WITH DIABETES: ICD-10-CM

## 2018-12-03 PROCEDURE — 3044F HG A1C LEVEL LT 7.0%: CPT | Mod: CPTII,S$GLB,, | Performed by: INTERNAL MEDICINE

## 2018-12-03 PROCEDURE — 99214 OFFICE O/P EST MOD 30 MIN: CPT | Mod: S$GLB,,, | Performed by: INTERNAL MEDICINE

## 2018-12-03 PROCEDURE — 3008F BODY MASS INDEX DOCD: CPT | Mod: CPTII,S$GLB,, | Performed by: INTERNAL MEDICINE

## 2018-12-03 PROCEDURE — 3078F DIAST BP <80 MM HG: CPT | Mod: CPTII,S$GLB,, | Performed by: INTERNAL MEDICINE

## 2018-12-03 PROCEDURE — 99999 PR PBB SHADOW E&M-EST. PATIENT-LVL III: CPT | Mod: PBBFAC,,, | Performed by: INTERNAL MEDICINE

## 2018-12-03 PROCEDURE — 3074F SYST BP LT 130 MM HG: CPT | Mod: CPTII,S$GLB,, | Performed by: INTERNAL MEDICINE

## 2018-12-03 RX ORDER — METFORMIN HYDROCHLORIDE 1000 MG/1
1000 TABLET ORAL 2 TIMES DAILY WITH MEALS
Qty: 180 TABLET | Refills: 3 | Status: SHIPPED | OUTPATIENT
Start: 2018-12-03 | End: 2019-08-02 | Stop reason: SDUPTHER

## 2018-12-03 RX ORDER — GLIMEPIRIDE 1 MG/1
1 TABLET ORAL
Qty: 90 TABLET | Refills: 3 | Status: SHIPPED | OUTPATIENT
Start: 2018-12-03 | End: 2019-10-29 | Stop reason: SDUPTHER

## 2018-12-03 RX ORDER — TESTOSTERONE 50 MG/5G
5 GEL TRANSDERMAL DAILY
Qty: 30 TUBE | Refills: 5 | Status: SHIPPED | OUTPATIENT
Start: 2018-12-03 | End: 2019-06-05 | Stop reason: SDUPTHER

## 2018-12-03 RX ORDER — INSULIN LISPRO 100 [IU]/ML
INJECTION, SOLUTION INTRAVENOUS; SUBCUTANEOUS
Qty: 15 ML | Refills: 3 | Status: SHIPPED | OUTPATIENT
Start: 2018-12-03 | End: 2019-01-24 | Stop reason: SDUPTHER

## 2018-12-03 RX ORDER — ZONISAMIDE 50 MG/1
CAPSULE ORAL
Status: ON HOLD | COMMUNITY
Start: 2018-12-01 | End: 2019-03-13 | Stop reason: CLARIF

## 2018-12-03 NOTE — ASSESSMENT & PLAN NOTE
Reviewed  data on risks and benefits of testosterone therapy.  Studies show it may increase the risk of CAD and death and that its use should be targeting QOL.     He is doing well     Follow labs

## 2018-12-03 NOTE — ASSESSMENT & PLAN NOTE
Reviewed goals of therapy are to get the best control we can without hypoglycemia   Medication changes:  None        Reviewed patient's current regimen.       -Advised frequent self blood glucose monitoring.  Patient encouraged to document glucose results and bring them to every clinic visit      -Hypoglycemia precautions discussed. Instructed on precautions before driving.      -Close adherence to lifestyle changes recommended.      -Periodic follow ups for eye evaluations, foot care and dental care suggested.    rtc with me or DENIS in 1 year

## 2018-12-22 RX ORDER — BLOOD-GLUCOSE METER
EACH MISCELLANEOUS
Qty: 100 STRIP | Refills: 0 | Status: SHIPPED | OUTPATIENT
Start: 2018-12-22 | End: 2019-01-20 | Stop reason: SDUPTHER

## 2018-12-24 DIAGNOSIS — E55.9 VITAMIN D DEFICIENCY: ICD-10-CM

## 2018-12-24 RX ORDER — ERGOCALCIFEROL 1.25 MG/1
CAPSULE ORAL
Qty: 12 CAPSULE | Refills: 1 | Status: SHIPPED | OUTPATIENT
Start: 2018-12-24 | End: 2019-01-24 | Stop reason: SDUPTHER

## 2018-12-25 DIAGNOSIS — E11.42 TYPE 2 DIABETES MELLITUS WITH DIABETIC POLYNEUROPATHY, WITHOUT LONG-TERM CURRENT USE OF INSULIN: ICD-10-CM

## 2018-12-26 RX ORDER — PEN NEEDLE, DIABETIC 30 GX3/16"
NEEDLE, DISPOSABLE MISCELLANEOUS
Qty: 100 EACH | Refills: 0 | Status: SHIPPED | OUTPATIENT
Start: 2018-12-26 | End: 2019-02-25 | Stop reason: SDUPTHER

## 2019-01-21 RX ORDER — BLOOD-GLUCOSE METER
EACH MISCELLANEOUS
Qty: 100 STRIP | Refills: 0 | Status: SHIPPED | OUTPATIENT
Start: 2019-01-21 | End: 2019-02-13 | Stop reason: SDUPTHER

## 2019-01-23 ENCOUNTER — PATIENT MESSAGE (OUTPATIENT)
Dept: FAMILY MEDICINE | Facility: CLINIC | Age: 64
End: 2019-01-23

## 2019-01-23 DIAGNOSIS — E55.9 VITAMIN D DEFICIENCY: ICD-10-CM

## 2019-01-23 RX ORDER — KETOCONAZOLE 20 MG/G
CREAM TOPICAL
Refills: 0 | COMMUNITY
Start: 2018-12-11

## 2019-01-23 RX ORDER — HYDROCODONE BITARTRATE AND ACETAMINOPHEN 10; 325 MG/1; MG/1
TABLET ORAL
Refills: 0 | COMMUNITY
Start: 2019-01-04 | End: 2019-12-03

## 2019-01-24 DIAGNOSIS — E11.42 TYPE 2 DIABETES MELLITUS WITH DIABETIC POLYNEUROPATHY, WITHOUT LONG-TERM CURRENT USE OF INSULIN: ICD-10-CM

## 2019-01-24 RX ORDER — ERGOCALCIFEROL 1.25 MG/1
50000 CAPSULE ORAL
Qty: 12 CAPSULE | Refills: 1 | Status: SHIPPED | OUTPATIENT
Start: 2019-01-24 | End: 2019-07-02 | Stop reason: SDUPTHER

## 2019-01-24 RX ORDER — INSULIN LISPRO 100 [IU]/ML
INJECTION, SOLUTION INTRAVENOUS; SUBCUTANEOUS
Qty: 15 ML | Refills: 3 | Status: SHIPPED | OUTPATIENT
Start: 2019-01-24 | End: 2019-08-20 | Stop reason: SDUPTHER

## 2019-01-24 NOTE — TELEPHONE ENCOUNTER
----- Message from Elizabeth Dickson sent at 1/24/2019  9:37 AM CST -----  Contact: Self  .Rx Refill/Request     Is this a Refill or New Rx:  Refill  Rx Name and Strength:  insulin lispro (HUMALOG KWIKPEN INSULIN) 100 unit/mL InPn pen  Preferred Pharmacy with phone number:  [facility], [phone], Fax- [phone]  Communication Preference:  [phone]  Additional Information: Asking if can increase to 15 units so pt will not run out.

## 2019-01-24 NOTE — TELEPHONE ENCOUNTER
Patient uses sliding scale.  Edited rx to reflect up to 15 units a day and will send in to pharmacy

## 2019-01-28 DIAGNOSIS — E11.69 HYPERLIPIDEMIA ASSOCIATED WITH TYPE 2 DIABETES MELLITUS: ICD-10-CM

## 2019-01-28 DIAGNOSIS — E78.5 HYPERLIPIDEMIA ASSOCIATED WITH TYPE 2 DIABETES MELLITUS: ICD-10-CM

## 2019-01-28 DIAGNOSIS — I10 ESSENTIAL HYPERTENSION: ICD-10-CM

## 2019-01-28 RX ORDER — LISINOPRIL 10 MG/1
TABLET ORAL
Qty: 90 TABLET | Refills: 3 | Status: SHIPPED | OUTPATIENT
Start: 2019-01-28 | End: 2020-01-10

## 2019-01-28 RX ORDER — EZETIMIBE AND SIMVASTATIN 10; 40 MG/1; MG/1
1 TABLET ORAL NIGHTLY
Qty: 90 TABLET | Refills: 3 | Status: SHIPPED | OUTPATIENT
Start: 2019-01-28 | End: 2019-10-08 | Stop reason: SDUPTHER

## 2019-01-28 NOTE — TELEPHONE ENCOUNTER
Patient notified I called his pharmacy last week increasing his Humalog to 15 units with each meal with refills. States they said they didn't get it.   I will call them again

## 2019-01-28 NOTE — TELEPHONE ENCOUNTER
----- Message from Elizabeth Dickson sent at 1/28/2019 12:44 PM CST -----  Contact: Self  .Needs Advice    Reason for call: Please call Pt regarding his Humolog        Communication Preference:  [phone]    Additional Information:

## 2019-02-08 ENCOUNTER — PATIENT MESSAGE (OUTPATIENT)
Dept: FAMILY MEDICINE | Facility: CLINIC | Age: 64
End: 2019-02-08

## 2019-02-11 ENCOUNTER — PATIENT MESSAGE (OUTPATIENT)
Dept: FAMILY MEDICINE | Facility: CLINIC | Age: 64
End: 2019-02-11

## 2019-02-12 DIAGNOSIS — Z86.010 HISTORY OF COLON POLYPS: Primary | ICD-10-CM

## 2019-02-12 NOTE — TELEPHONE ENCOUNTER
Colonoscopy Referral   Referring Physician: Dr. Gopal Chavez  Date:   Reason for Referral: Colon Cancer Screening  Family History of:   Colon polyp:  NO  Relationship/Age of Onset: N/A  Colon cancer: NO  Relationship/Age of Onset: N/A  Patient with:   Hemoccults Done:  NO  Iron deficient: NO  On Blood Thinner: NO  Valvular heart disease/valve replacement:  NO  Anemia Present: NO  On NSAID: ASA 81mg, meloxicam  Lung disease: NO  Kidney disease: NO  Hx of polyps: Yes  Hx of colon cancer: NO  Previous colon evalations: YES      When: December 2013  Where: [facility]  Pertinent symptoms: None    Patient was scheduled for colonoscopy on 03/13/2019 with Dr. Shaw at [facility].  Clenpiq instructions were reviewed with patient.

## 2019-02-12 NOTE — LETTER
[facility]  [address]  [place] [address]  Phone: [phone]               Zhao Dodd  [address] [address]          CLENPIQ Instructions    You are scheduled for a colonoscopy with Dr. Shaw on 03/13/2019 at [facility] [address] [place] [address]  To ensure that your test is accurate and complete, you MUST follow these instructions listed below.  If you have any questions, please call our office at [phone].  Plan on being at the hospital for your procedure for 3-4 hours.    1.  Follow a CLEAR LIQUID DIET for the entire day before your scheduled colonoscopy.  This means no solid food the entire day starting when you wake.  You may have as much of the clear liquids as you want throughout the day.   CLEAR LIQUID DIET:   - Avoid Red, Orange, Purple, and/or Blue food coloring   - NO DAIRY   - You can have:  Coffee with sugar (no creamer), tea, water, soda, apple or white grape juice, chicken or beef broth/bouillon (no meat, noodles, or veggies), green/yellow popsicles, green/yellow Jell-O, lemonade.    2.  AT 5 pm the evening before your colonoscopy, OPEN ONE (1) BOTTLE OF CLENPIQ AND DRINK THE ENTIRE BOTTLE.  DRINK FIVE (5) 8-OUNCE GLASSES OF WATER (40 OUNCES TOTAL) OVER THE NEXT FIVE (5) HOURS.     3.  The endoscopy department will call you 2 days before your colonoscopy to tell you the exact time to arrive, AND to tell you the exact time to drink the 2nd portion of your prep (which will be FIVE HOURS BEFORE YOUR ARRIVAL TIME).  At this time given to you, OPEN THE OTHER ONE (1) BOTTLE OF CLENPIQ AND DRINK THE ENTIRE BOTTLE.  DRINK THREE (3) 8-OUNCE GLASSES OF WATER (24 OUNCES TOTAL) OVER THE NEXT THREE (3) HOURS. Once this is complete, you can not have anything else by mouth!    4.  You must have someone with you to DRIVE YOU HOME since you will be receiving IV sedation for the colonoscopy.    5.  It is ok to take your heart, blood pressure, and  seizure medications in the morning of your test with a SIP of water.  Hold other medications until after your procedure.  Do NOT have anything else to eat or drink the morning of your colonoscopy.  It is ok to brush your teeth.    6.  If you are on blood thinners THAT YOU HAVE BEEN INSTRUCTED TO HOLD BY YOUR DOCTOR FOR THIS PROCEDURE, then do NOT take this the morning of your colonoscopy.  Do NOT stop these medications on your own, they must be approved to be held by your doctor.  Your colonoscopy can NOT be done if you are on these medications.  Examples of blood thinners include: Coumadin, Aggrenox, Plavix, Pradaxa, Reapro, Pletal, Xarelto, Ticagrelor, Brilinta, Eliquis, and high dose aspirin (325 mg).  You do not have to stop baby aspirin 81 mg.    7.  IF YOU ARE DIABETIC:  NO INSULIN OR ORAL MEDICATIONS THE MORNING OF THE COLONOSCOPY.  TAKE ONLY HALF THE DOSE OF YOUR INSULIN THE DAY BEFORE THE COLONOSCOPY.  DO NOT TAKE ANY ORAL DIABETIC MEDICATIONS THE DAY BEFORE THE COLONOSCOPY.  IF YOU ARE AN INSULIN DEPENDENT DIABETIC WITH UNSTABLE BLOOD SUGARDS, NOTIFY YOUR PRIMARY CARE PHYSICIAN FOR INSTRUCTIONS.

## 2019-02-13 ENCOUNTER — TELEPHONE (OUTPATIENT)
Dept: GASTROENTEROLOGY | Facility: CLINIC | Age: 64
End: 2019-02-13

## 2019-02-13 ENCOUNTER — PATIENT MESSAGE (OUTPATIENT)
Dept: GASTROENTEROLOGY | Facility: CLINIC | Age: 64
End: 2019-02-13

## 2019-02-13 DIAGNOSIS — Z79.4 TYPE 2 DIABETES MELLITUS WITHOUT COMPLICATION, WITH LONG-TERM CURRENT USE OF INSULIN: Primary | ICD-10-CM

## 2019-02-13 DIAGNOSIS — E11.9 TYPE 2 DIABETES MELLITUS WITHOUT COMPLICATION, WITH LONG-TERM CURRENT USE OF INSULIN: Primary | ICD-10-CM

## 2019-02-13 RX ORDER — BLOOD-GLUCOSE METER
EACH MISCELLANEOUS
Qty: 100 STRIP | Refills: 11 | Status: SHIPPED | OUTPATIENT
Start: 2019-02-13 | End: 2019-11-01 | Stop reason: SDUPTHER

## 2019-02-21 RX ORDER — BLOOD-GLUCOSE METER
EACH MISCELLANEOUS
Qty: 100 STRIP | Refills: 0 | Status: ON HOLD | OUTPATIENT
Start: 2019-02-21 | End: 2019-03-13 | Stop reason: CLARIF

## 2019-02-25 DIAGNOSIS — E11.42 TYPE 2 DIABETES MELLITUS WITH DIABETIC POLYNEUROPATHY, WITHOUT LONG-TERM CURRENT USE OF INSULIN: ICD-10-CM

## 2019-02-25 RX ORDER — PEN NEEDLE, DIABETIC 30 GX3/16"
NEEDLE, DISPOSABLE MISCELLANEOUS
Qty: 100 EACH | Refills: 11 | Status: SHIPPED | OUTPATIENT
Start: 2019-02-25 | End: 2020-03-10

## 2019-03-11 ENCOUNTER — TELEPHONE (OUTPATIENT)
Dept: ENDOSCOPY | Facility: HOSPITAL | Age: 64
End: 2019-03-11

## 2019-03-11 NOTE — TELEPHONE ENCOUNTER
Spoke with patient about arrival time @0730    Prep instructions reviewed: the day before the procedure, follow a clear liquid diet all day, then start the first 1/2 of prep at 5pm and take 2nd 1/2 of prep @0130.  Pt must be completely NPO when prep completed @0230.              Medications: Do not take Insulin or oral diabetic medications the day of the procedure.  Take as prescribed: heart, seizure and blood pressure medication in the morning with a sip of water (less than an ounce).  Take any breathing medications and bring inhalers to hospital with you Leave all valuables and jewelry at home.     Wear comfortable clothes to procedure to change into hospital gown You cannot drive for 24 hours after your procedure because you will receive sedation for your procedure to make you comfortable.  A ride must be provided at discharge.

## 2019-03-13 ENCOUNTER — ANESTHESIA EVENT (OUTPATIENT)
Dept: ENDOSCOPY | Facility: HOSPITAL | Age: 64
End: 2019-03-13
Payer: MEDICARE

## 2019-03-13 ENCOUNTER — HOSPITAL ENCOUNTER (OUTPATIENT)
Facility: HOSPITAL | Age: 64
Discharge: HOME | End: 2019-03-13
Attending: INTERNAL MEDICINE | Admitting: INTERNAL MEDICINE
Payer: MEDICARE

## 2019-03-13 ENCOUNTER — ANESTHESIA (OUTPATIENT)
Dept: ENDOSCOPY | Facility: HOSPITAL | Age: 64
End: 2019-03-13
Payer: MEDICARE

## 2019-03-13 VITALS
OXYGEN SATURATION: 99 % | BODY MASS INDEX: 30.8 KG/M2 | HEART RATE: 100 BPM | DIASTOLIC BLOOD PRESSURE: 76 MMHG | TEMPERATURE: 98 F | WEIGHT: 240 LBS | SYSTOLIC BLOOD PRESSURE: 117 MMHG | HEIGHT: 74 IN | RESPIRATION RATE: 18 BRPM

## 2019-03-13 DIAGNOSIS — Z86.010 PERSONAL HISTORY OF COLONIC POLYPS: Primary | ICD-10-CM

## 2019-03-13 LAB — POCT GLUCOSE: 147 MG/DL (ref 70–110)

## 2019-03-13 PROCEDURE — 37000008 HC ANESTHESIA 1ST 15 MINUTES: Performed by: INTERNAL MEDICINE

## 2019-03-13 PROCEDURE — 82962 GLUCOSE BLOOD TEST: CPT | Performed by: INTERNAL MEDICINE

## 2019-03-13 PROCEDURE — 45385 COLONOSCOPY W/LESION REMOVAL: CPT | Performed by: INTERNAL MEDICINE

## 2019-03-13 PROCEDURE — 88305 TISSUE SPECIMEN TO PATHOLOGY - SURGERY: ICD-10-PCS | Mod: 26,,, | Performed by: PATHOLOGY

## 2019-03-13 PROCEDURE — 88305 TISSUE EXAM BY PATHOLOGIST: CPT | Performed by: PATHOLOGY

## 2019-03-13 PROCEDURE — 45385 COLONOSCOPY W/LESION REMOVAL: CPT | Mod: PT,,, | Performed by: INTERNAL MEDICINE

## 2019-03-13 PROCEDURE — 88305 TISSUE EXAM BY PATHOLOGIST: CPT | Mod: 26,,, | Performed by: PATHOLOGY

## 2019-03-13 PROCEDURE — 27201089 HC SNARE, DISP (ANY): Performed by: INTERNAL MEDICINE

## 2019-03-13 PROCEDURE — 25000003 PHARM REV CODE 250: Performed by: INTERNAL MEDICINE

## 2019-03-13 PROCEDURE — 37000009 HC ANESTHESIA EA ADD 15 MINS: Performed by: INTERNAL MEDICINE

## 2019-03-13 PROCEDURE — 63600175 PHARM REV CODE 636 W HCPCS: Performed by: NURSE ANESTHETIST, CERTIFIED REGISTERED

## 2019-03-13 PROCEDURE — 45385 PR COLONOSCOPY,REMV LESN,SNARE: ICD-10-PCS | Mod: PT,,, | Performed by: INTERNAL MEDICINE

## 2019-03-13 RX ORDER — SODIUM CHLORIDE 0.9 % (FLUSH) 0.9 %
3 SYRINGE (ML) INJECTION
Status: DISCONTINUED | OUTPATIENT
Start: 2019-03-13 | End: 2019-03-13 | Stop reason: HOSPADM

## 2019-03-13 RX ORDER — SODIUM CHLORIDE 9 MG/ML
INJECTION, SOLUTION INTRAVENOUS CONTINUOUS
Status: DISCONTINUED | OUTPATIENT
Start: 2019-03-13 | End: 2019-03-13 | Stop reason: HOSPADM

## 2019-03-13 RX ORDER — PROPOFOL 10 MG/ML
VIAL (ML) INTRAVENOUS
Status: DISCONTINUED | OUTPATIENT
Start: 2019-03-13 | End: 2019-03-13

## 2019-03-13 RX ORDER — LIDOCAINE HCL/PF 100 MG/5ML
SYRINGE (ML) INTRAVENOUS
Status: DISCONTINUED | OUTPATIENT
Start: 2019-03-13 | End: 2019-03-13

## 2019-03-13 RX ORDER — PROPOFOL 10 MG/ML
VIAL (ML) INTRAVENOUS CONTINUOUS PRN
Status: DISCONTINUED | OUTPATIENT
Start: 2019-03-13 | End: 2019-03-13

## 2019-03-13 RX ADMIN — LIDOCAINE HYDROCHLORIDE 100 MG: 20 INJECTION, SOLUTION INTRAVENOUS at 09:03

## 2019-03-13 RX ADMIN — SODIUM CHLORIDE: 0.9 INJECTION, SOLUTION INTRAVENOUS at 08:03

## 2019-03-13 RX ADMIN — PROPOFOL 40 MG: 10 INJECTION, EMULSION INTRAVENOUS at 09:03

## 2019-03-13 RX ADMIN — PROPOFOL 60 MG: 10 INJECTION, EMULSION INTRAVENOUS at 09:03

## 2019-03-13 RX ADMIN — PROPOFOL 175 MCG/KG/MIN: 10 INJECTION, EMULSION INTRAVENOUS at 09:03

## 2019-03-13 NOTE — ANESTHESIA POSTPROCEDURE EVALUATION
"Anesthesia Post Evaluation    Patient: Zhao Dodd JrHadley    Procedure(s) Performed: Procedure(s) (LRB):  COLONOSCOPY/Clenpiq (N/A)    Final Anesthesia Type: MAC  Patient location during evaluation: GI PACU  Patient participation: Yes- Able to Participate  Level of consciousness: awake and alert  Post-procedure vital signs: reviewed and stable  Pain management: adequate  Airway patency: patent  PONV status at discharge: No PONV  Anesthetic complications: no      Cardiovascular status: blood pressure returned to baseline  Respiratory status: unassisted  Hydration status: euvolemic  Follow-up not needed.        Visit Vitals  BP (!) 143/69 (BP Location: Left arm, Patient Position: Lying)   Pulse 99   Temp 36.6 °C (97.8 °F) (Temporal)   Resp 20   Ht 6' 2" (1.88 m)   Wt 108.9 kg (240 lb)   SpO2 97%   BMI 30.81 kg/m²       Pain/Olivia Score: No Data Recorded      "

## 2019-03-13 NOTE — [employer] PATIENT INSTRUCTIONS
Discharge Summary/Instructions after an Endoscopic Procedure  Patient Name: Zhao Dodd  Patient MRN: [id number]  Patient YOB: 1955  Wednesday, March 13, 2019  Alona Shaw MD  RESTRICTIONS:  During your procedure today, you received medications for sedation.  These   medications may affect your judgment, balance and coordination.  Therefore,   for 24 hours, you have the following restrictions:   - DO NOT drive a car, operate machinery, make legal/financial decisions,   sign important papers or drink alcohol.    ACTIVITY:  Today: no heavy lifting, straining or running due to procedural   sedation/anesthesia.  The following day: return to full activity including work.  DIET:  Eat and drink normally unless instructed otherwise.     TREATMENT FOR COMMON SIDE EFFECTS:  - Mild abdominal pain, nausea, belching, bloating or excessive gas:  rest,   eat lightly and use a heating pad.  - Sore Throat: treat with throat lozenges and/or gargle with warm salt   water.  - Because air was used during the procedure, expelling large amounts of air   from your rectum or belching is normal.  - If a bowel prep was taken, you may not have a bowel movement for 1-3 days.    This is normal.  SYMPTOMS TO WATCH FOR AND REPORT TO YOUR PHYSICIAN:  1. Abdominal pain or bloating, other than gas cramps.  2. Chest pain.  3. Back pain.  4. Signs of infection such as: chills or fever occurring within 24 hours   after the procedure.  5. Rectal bleeding, which would show as bright red, maroon, or black stools.   (A tablespoon of blood from the rectum is not serious, especially if   hemorrhoids are present.)  6. Vomiting.  7. Weakness or dizziness.  GO DIRECTLY TO THE NEAREST EMERGENCY ROOM IF YOU HAVE ANY OF THE FOLLOWING:      Difficulty breathing              Chills and/or fever over 101 F   Persistent vomiting and/or vomiting blood   Severe abdominal pain   Severe chest pain   Black, tarry stools   Bleeding- more than one  tablespoon   Any other symptom or condition that you feel may need urgent attention  Your doctor recommends these additional instructions:  If any biopsies were taken, your doctors clinic will contact you in 1 to 2   weeks with any results.  - Discharge patient to home.   - Patient has a contact number available for emergencies.  The signs and   symptoms of potential delayed complications were discussed with the   patient.  Return to normal activities tomorrow.  Written discharge   instructions were provided to the patient.   - Resume previous diet.   - Continue present medications.   - Await pathology results.   - Repeat colonoscopy in 3 years for surveillance based on pathology results.     - Return to GI clinic PRN.  For questions, problems or results please call your physician - Alona Shaw MD at Work:  [phone].  EMERGENCY PHONE NUMBER: [phone],  LAB RESULTS: [phone]  IF A COMPLICATION OR EMERGENCY SITUATION ARISES AND YOU ARE UNABLE TO REACH   YOUR PHYSICIAN - GO DIRECTLY TO THE EMERGENCY ROOM.  MD Alona Tavarez MD  3/13/2019 10:06:43 AM  This report has been verified and signed electronically.  [employer]

## 2019-03-13 NOTE — TRANSFER OF CARE
"Anesthesia Transfer of Care Note    Patient: Zhao Dodd Jr.    Procedure(s) Performed: Procedure(s) (LRB):  COLONOSCOPY/Clenpiq (N/A)    Patient location: PACU    Anesthesia Type: MAC    Transport from OR: Transported from OR on room air with adequate spontaneous ventilation    Post pain: adequate analgesia    Post assessment: no apparent anesthetic complications and tolerated procedure well    Post vital signs: stable    Level of consciousness: awake, alert and oriented    Nausea/Vomiting: no nausea/vomiting    Complications: none    Transfer of care protocol was followed      Last vitals:   Visit Vitals  BP (!) 143/69 (BP Location: Left arm, Patient Position: Lying)   Pulse 99   Temp 36.6 °C (97.8 °F) (Temporal)   Resp 20   Ht 6' 2" (1.88 m)   Wt 108.9 kg (240 lb)   SpO2 97%   BMI 30.81 kg/m²     "

## 2019-03-13 NOTE — PLAN OF CARE
Admission process complete. Patient ready for procedure. Plan of care reviewed with patient and his wife. Preoperative fasting appropriate for procedure and sedation. Call light in reach and bed in lowest position.

## 2019-03-13 NOTE — H&P
CC: Personal h/o colon polyps, FH of colon cancer    65 yo M with h/o polyps on first colonoscopy, none on the second colonoscopy.  His mother had colon cancer at the age of 56 yo.    ROS:  No headache, no fever/chills, no chest pain/SOB, no dysuria/hematuria.    VSSAF   Exam:   Alert and oriented x 3; no apparent distress   PERRLA, sclera anicteric  CV: Regular rate/rhythm, normal PMI   Lungs: Clear bilaterally with no wheeze/rales   Abdomen: Soft, NT/ND, normal bowel sounds   Ext: No cyanosis, clubbing     Impression:   Personal h/o colon polyps  FH of colon cancer    Plan:   Proceed with endoscopy. Further recs to follow.

## 2019-03-13 NOTE — ANESTHESIA PREPROCEDURE EVALUATION
03/13/2019  Zhao Dodd Jr. is a 64 y.o., male for colonoscopy under MAC    Past Medical History:   Diagnosis Date    Colon polyp 2009    HTN (hypertension), benign     Hyperlipidemia LDL goal < 70     Hypogonadism, male     Obesity (BMI 30-39.9) 2/7/2014    PN (peripheral neuropathy)     Skin cancer     leg    Type II or unspecified type diabetes mellitus with neurological manifestations, uncontrolled(250.62) 11/3/2014    Vitamin D deficiency 2/7/2014         Anesthesia Evaluation    I have reviewed the Patient Summary Reports.    I have reviewed the Nursing Notes.   I have reviewed the Medications.     Review of Systems      Physical Exam  General:  Well nourished    Airway/Jaw/Neck:  Airway Findings: Mallampati: II      Chest/Lungs:  Chest/Lungs Clear    Heart/Vascular:  Heart Findings: Normal                Anesthesia Plan  Type of Anesthesia, risks & benefits discussed:  Anesthesia Type:  MAC  Patient's Preference:   Intra-op Monitoring Plan:   Intra-op Monitoring Plan Comments:   Post Op Pain Control Plan:   Post Op Pain Control Plan Comments:   Induction:    Beta Blocker:  Patient is not currently on a Beta-Blocker (No further documentation required).       Informed Consent: Patient understands risks and agrees with Anesthesia plan.  Questions answered. Anesthesia consent signed with patient.  ASA Score: 3     Day of Surgery Review of History & Physical:            Ready For Surgery From Anesthesia Perspective.

## 2019-03-28 RX ORDER — CLINDAMYCIN PHOSPHATE 10 MG/ML
SOLUTION TOPICAL
Refills: 2 | COMMUNITY
Start: 2019-02-11 | End: 2019-07-07

## 2019-03-28 RX ORDER — SODIUM PICOSULFATE, MAGNESIUM OXIDE, AND ANHYDROUS CITRIC ACID 10; 3.5; 12 MG/160ML; G/160ML; G/160ML
LIQUID ORAL
Refills: 0 | COMMUNITY
Start: 2019-02-13

## 2019-03-28 RX ORDER — ZONISAMIDE 50 MG/1
CAPSULE ORAL
Refills: 2 | COMMUNITY
Start: 2019-03-15

## 2019-03-28 RX ORDER — OXICONAZOLE NITRATE 10 MG/G
CREAM TOPICAL
Refills: 2 | COMMUNITY
Start: 2019-02-12

## 2019-04-10 DIAGNOSIS — E11.42 TYPE 2 DIABETES MELLITUS WITH DIABETIC POLYNEUROPATHY, WITHOUT LONG-TERM CURRENT USE OF INSULIN: ICD-10-CM

## 2019-04-11 DIAGNOSIS — E55.9 VITAMIN D DEFICIENCY: ICD-10-CM

## 2019-04-12 RX ORDER — ERGOCALCIFEROL 1.25 MG/1
CAPSULE ORAL
Qty: 12 CAPSULE | Refills: 1 | OUTPATIENT
Start: 2019-04-12

## 2019-06-05 DIAGNOSIS — E29.1 HYPOGONADISM IN MALE: ICD-10-CM

## 2019-06-05 RX ORDER — TESTOSTERONE 50 MG/5G
GEL TRANSDERMAL
Qty: 150 G | Refills: 0 | Status: SHIPPED | OUTPATIENT
Start: 2019-06-05 | End: 2019-07-02 | Stop reason: SDUPTHER

## 2019-06-13 ENCOUNTER — PATIENT MESSAGE (OUTPATIENT)
Dept: ENDOCRINOLOGY | Facility: CLINIC | Age: 64
End: 2019-06-13

## 2019-07-01 ENCOUNTER — PATIENT MESSAGE (OUTPATIENT)
Dept: ENDOCRINOLOGY | Facility: CLINIC | Age: 64
End: 2019-07-01

## 2019-07-01 ENCOUNTER — TELEPHONE (OUTPATIENT)
Dept: FAMILY MEDICINE | Facility: CLINIC | Age: 64
End: 2019-07-01

## 2019-07-01 DIAGNOSIS — E11.69 HYPERLIPIDEMIA ASSOCIATED WITH TYPE 2 DIABETES MELLITUS: ICD-10-CM

## 2019-07-01 DIAGNOSIS — E29.1 HYPOGONADISM IN MALE: ICD-10-CM

## 2019-07-01 DIAGNOSIS — E29.1 HYPOGONADISM, MALE: Primary | ICD-10-CM

## 2019-07-01 DIAGNOSIS — N42.9 DISORDER OF PROSTATE: ICD-10-CM

## 2019-07-01 DIAGNOSIS — Z79.890 LONG-TERM CURRENT USE OF TESTOSTERONE REPLACEMENT THERAPY: ICD-10-CM

## 2019-07-01 DIAGNOSIS — E78.5 HYPERLIPIDEMIA ASSOCIATED WITH TYPE 2 DIABETES MELLITUS: ICD-10-CM

## 2019-07-01 DIAGNOSIS — I15.2 HYPERTENSION ASSOCIATED WITH DIABETES: ICD-10-CM

## 2019-07-01 DIAGNOSIS — E55.9 VITAMIN D DEFICIENCY: ICD-10-CM

## 2019-07-01 DIAGNOSIS — E11.42 TYPE 2 DIABETES MELLITUS WITH DIABETIC POLYNEUROPATHY, WITHOUT LONG-TERM CURRENT USE OF INSULIN: ICD-10-CM

## 2019-07-01 DIAGNOSIS — E11.59 HYPERTENSION ASSOCIATED WITH DIABETES: ICD-10-CM

## 2019-07-01 NOTE — TELEPHONE ENCOUNTER
----- Message from Desiree Martin sent at 7/1/2019  2:43 PM CDT -----  Contact: pt  Name of Who is Calling: tiffany      What is the request in detail: pt needs to get lab orders placed. Please call pt and advise so he can have his labs done this week      Can the clinic reply by [facility]: no      What Number [facility] Call Back [facility]: [phone]

## 2019-07-02 DIAGNOSIS — E55.9 VITAMIN D DEFICIENCY: ICD-10-CM

## 2019-07-02 DIAGNOSIS — E29.1 HYPOGONADISM IN MALE: ICD-10-CM

## 2019-07-02 RX ORDER — TESTOSTERONE 50 MG/5G
GEL TRANSDERMAL
Qty: 150 G | Refills: 5 | OUTPATIENT
Start: 2019-07-02

## 2019-07-02 RX ORDER — TESTOSTERONE 50 MG/5G
GEL TRANSDERMAL
Qty: 150 G | Refills: 5 | Status: SHIPPED | OUTPATIENT
Start: 2019-07-02 | End: 2020-01-27 | Stop reason: SDUPTHER

## 2019-07-02 RX ORDER — ERGOCALCIFEROL 1.25 MG/1
CAPSULE ORAL
Qty: 12 CAPSULE | Refills: 0 | Status: SHIPPED | OUTPATIENT
Start: 2019-07-02 | End: 2019-09-22 | Stop reason: SDUPTHER

## 2019-07-03 ENCOUNTER — LAB VISIT (OUTPATIENT)
Dept: LAB | Facility: HOSPITAL | Age: 64
End: 2019-07-03
Attending: INTERNAL MEDICINE
Payer: MEDICARE

## 2019-07-03 DIAGNOSIS — E78.5 HYPERLIPIDEMIA ASSOCIATED WITH TYPE 2 DIABETES MELLITUS: ICD-10-CM

## 2019-07-03 DIAGNOSIS — I15.2 HYPERTENSION ASSOCIATED WITH DIABETES: ICD-10-CM

## 2019-07-03 DIAGNOSIS — E11.42 DIABETIC PERIPHERAL NEUROPATHY ASSOCIATED WITH TYPE 2 DIABETES MELLITUS: ICD-10-CM

## 2019-07-03 DIAGNOSIS — E11.42 TYPE 2 DIABETES MELLITUS WITH DIABETIC POLYNEUROPATHY, WITHOUT LONG-TERM CURRENT USE OF INSULIN: ICD-10-CM

## 2019-07-03 DIAGNOSIS — E55.9 VITAMIN D DEFICIENCY: ICD-10-CM

## 2019-07-03 DIAGNOSIS — E29.1 HYPOGONADISM, MALE: ICD-10-CM

## 2019-07-03 DIAGNOSIS — E11.59 HYPERTENSION ASSOCIATED WITH DIABETES: ICD-10-CM

## 2019-07-03 DIAGNOSIS — E11.69 HYPERLIPIDEMIA ASSOCIATED WITH TYPE 2 DIABETES MELLITUS: ICD-10-CM

## 2019-07-03 DIAGNOSIS — Z79.890 LONG-TERM CURRENT USE OF TESTOSTERONE REPLACEMENT THERAPY: ICD-10-CM

## 2019-07-03 DIAGNOSIS — N42.9 DISORDER OF PROSTATE: ICD-10-CM

## 2019-07-03 LAB
25(OH)D3+25(OH)D2 SERPL-MCNC: 36 NG/ML (ref 30–96)
ALBUMIN SERPL BCP-MCNC: 4.2 G/DL (ref 3.5–5.2)
ALBUMIN SERPL BCP-MCNC: 4.2 G/DL (ref 3.5–5.2)
ALP SERPL-CCNC: 59 U/L (ref 55–135)
ALP SERPL-CCNC: 59 U/L (ref 55–135)
ALT SERPL W/O P-5'-P-CCNC: 9 U/L (ref 10–44)
ALT SERPL W/O P-5'-P-CCNC: 9 U/L (ref 10–44)
ANION GAP SERPL CALC-SCNC: 11 MMOL/L (ref 8–16)
ANION GAP SERPL CALC-SCNC: 11 MMOL/L (ref 8–16)
AST SERPL-CCNC: 21 U/L (ref 10–40)
AST SERPL-CCNC: 21 U/L (ref 10–40)
BASOPHILS # BLD AUTO: 0.1 K/UL (ref 0–0.2)
BASOPHILS # BLD AUTO: 0.1 K/UL (ref 0–0.2)
BASOPHILS NFR BLD: 1.3 % (ref 0–1.9)
BASOPHILS NFR BLD: 1.3 % (ref 0–1.9)
BILIRUB SERPL-MCNC: 0.2 MG/DL (ref 0.1–1)
BILIRUB SERPL-MCNC: 0.2 MG/DL (ref 0.1–1)
BUN SERPL-MCNC: 15 MG/DL (ref 8–23)
BUN SERPL-MCNC: 15 MG/DL (ref 8–23)
CALCIUM SERPL-MCNC: 9.4 MG/DL (ref 8.7–10.5)
CALCIUM SERPL-MCNC: 9.4 MG/DL (ref 8.7–10.5)
CHLORIDE SERPL-SCNC: 105 MMOL/L (ref 95–110)
CHLORIDE SERPL-SCNC: 105 MMOL/L (ref 95–110)
CHOLEST SERPL-MCNC: 135 MG/DL (ref 120–199)
CHOLEST/HDLC SERPL: 3 {RATIO} (ref 2–5)
CO2 SERPL-SCNC: 22 MMOL/L (ref 23–29)
CO2 SERPL-SCNC: 22 MMOL/L (ref 23–29)
COMPLEXED PSA SERPL-MCNC: 1 NG/ML (ref 0–4)
CREAT SERPL-MCNC: 1 MG/DL (ref 0.5–1.4)
CREAT SERPL-MCNC: 1 MG/DL (ref 0.5–1.4)
DIFFERENTIAL METHOD: ABNORMAL
DIFFERENTIAL METHOD: ABNORMAL
EOSINOPHIL # BLD AUTO: 0.6 K/UL (ref 0–0.5)
EOSINOPHIL # BLD AUTO: 0.6 K/UL (ref 0–0.5)
EOSINOPHIL NFR BLD: 7.8 % (ref 0–8)
EOSINOPHIL NFR BLD: 7.8 % (ref 0–8)
ERYTHROCYTE [DISTWIDTH] IN BLOOD BY AUTOMATED COUNT: 17.2 % (ref 11.5–14.5)
ERYTHROCYTE [DISTWIDTH] IN BLOOD BY AUTOMATED COUNT: 17.2 % (ref 11.5–14.5)
EST. GFR  (AFRICAN AMERICAN): >60 ML/MIN/1.73 M^2
EST. GFR  (AFRICAN AMERICAN): >60 ML/MIN/1.73 M^2
EST. GFR  (NON AFRICAN AMERICAN): >60 ML/MIN/1.73 M^2
EST. GFR  (NON AFRICAN AMERICAN): >60 ML/MIN/1.73 M^2
ESTIMATED AVG GLUCOSE: 163 MG/DL (ref 68–131)
ESTIMATED AVG GLUCOSE: 163 MG/DL (ref 68–131)
GLUCOSE SERPL-MCNC: 143 MG/DL (ref 70–110)
GLUCOSE SERPL-MCNC: 143 MG/DL (ref 70–110)
HBA1C MFR BLD HPLC: 7.3 % (ref 4–5.6)
HBA1C MFR BLD HPLC: 7.3 % (ref 4–5.6)
HCT VFR BLD AUTO: 44.2 % (ref 40–54)
HCT VFR BLD AUTO: 44.2 % (ref 40–54)
HDLC SERPL-MCNC: 45 MG/DL (ref 40–75)
HDLC SERPL: 33.3 % (ref 20–50)
HGB BLD-MCNC: 13.1 G/DL (ref 14–18)
HGB BLD-MCNC: 13.1 G/DL (ref 14–18)
IMM GRANULOCYTES # BLD AUTO: 0.02 K/UL (ref 0–0.04)
IMM GRANULOCYTES # BLD AUTO: 0.02 K/UL (ref 0–0.04)
IMM GRANULOCYTES NFR BLD AUTO: 0.3 % (ref 0–0.5)
IMM GRANULOCYTES NFR BLD AUTO: 0.3 % (ref 0–0.5)
LDLC SERPL CALC-MCNC: 62 MG/DL (ref 63–159)
LYMPHOCYTES # BLD AUTO: 1.8 K/UL (ref 1–4.8)
LYMPHOCYTES # BLD AUTO: 1.8 K/UL (ref 1–4.8)
LYMPHOCYTES NFR BLD: 22.9 % (ref 18–48)
LYMPHOCYTES NFR BLD: 22.9 % (ref 18–48)
MCH RBC QN AUTO: 24 PG (ref 27–31)
MCH RBC QN AUTO: 24 PG (ref 27–31)
MCHC RBC AUTO-ENTMCNC: 29.6 G/DL (ref 32–36)
MCHC RBC AUTO-ENTMCNC: 29.6 G/DL (ref 32–36)
MCV RBC AUTO: 81 FL (ref 82–98)
MCV RBC AUTO: 81 FL (ref 82–98)
MONOCYTES # BLD AUTO: 0.8 K/UL (ref 0.3–1)
MONOCYTES # BLD AUTO: 0.8 K/UL (ref 0.3–1)
MONOCYTES NFR BLD: 10.6 % (ref 4–15)
MONOCYTES NFR BLD: 10.6 % (ref 4–15)
NEUTROPHILS # BLD AUTO: 4.6 K/UL (ref 1.8–7.7)
NEUTROPHILS # BLD AUTO: 4.6 K/UL (ref 1.8–7.7)
NEUTROPHILS NFR BLD: 57.1 % (ref 38–73)
NEUTROPHILS NFR BLD: 57.1 % (ref 38–73)
NONHDLC SERPL-MCNC: 90 MG/DL
NRBC BLD-RTO: 0 /100 WBC
NRBC BLD-RTO: 0 /100 WBC
PLATELET # BLD AUTO: 306 K/UL (ref 150–350)
PLATELET # BLD AUTO: 306 K/UL (ref 150–350)
PMV BLD AUTO: 11.3 FL (ref 9.2–12.9)
PMV BLD AUTO: 11.3 FL (ref 9.2–12.9)
POTASSIUM SERPL-SCNC: 4.4 MMOL/L (ref 3.5–5.1)
POTASSIUM SERPL-SCNC: 4.4 MMOL/L (ref 3.5–5.1)
PROT SERPL-MCNC: 6.9 G/DL (ref 6–8.4)
PROT SERPL-MCNC: 6.9 G/DL (ref 6–8.4)
RBC # BLD AUTO: 5.46 M/UL (ref 4.6–6.2)
RBC # BLD AUTO: 5.46 M/UL (ref 4.6–6.2)
SODIUM SERPL-SCNC: 138 MMOL/L (ref 136–145)
SODIUM SERPL-SCNC: 138 MMOL/L (ref 136–145)
TESTOST SERPL-MCNC: 434 NG/DL (ref 304–1227)
TESTOST SERPL-MCNC: 434 NG/DL (ref 304–1227)
TRIGL SERPL-MCNC: 140 MG/DL (ref 30–150)
WBC # BLD AUTO: 7.96 K/UL (ref 3.9–12.7)
WBC # BLD AUTO: 7.96 K/UL (ref 3.9–12.7)

## 2019-07-03 PROCEDURE — 80053 COMPREHEN METABOLIC PANEL: CPT

## 2019-07-03 PROCEDURE — 84153 ASSAY OF PSA TOTAL: CPT

## 2019-07-03 PROCEDURE — 83036 HEMOGLOBIN GLYCOSYLATED A1C: CPT

## 2019-07-03 PROCEDURE — 82306 VITAMIN D 25 HYDROXY: CPT

## 2019-07-03 PROCEDURE — 36415 COLL VENOUS BLD VENIPUNCTURE: CPT | Mod: PO

## 2019-07-03 PROCEDURE — 80061 LIPID PANEL: CPT

## 2019-07-03 PROCEDURE — 85025 COMPLETE CBC W/AUTO DIFF WBC: CPT

## 2019-07-03 PROCEDURE — 84403 ASSAY OF TOTAL TESTOSTERONE: CPT

## 2019-07-07 RX ORDER — HYDROCODONE BITARTRATE AND ACETAMINOPHEN 7.5; 325 MG/1; MG/1
TABLET ORAL
Refills: 0 | COMMUNITY
Start: 2019-05-07 | End: 2019-07-07

## 2019-07-07 RX ORDER — NAPROXEN SODIUM 375 MG/1
TABLET, FILM COATED, EXTENDED RELEASE ORAL
Refills: 2 | COMMUNITY
Start: 2019-06-19 | End: 2019-07-07

## 2019-07-07 RX ORDER — OMEPRAZOLE 40 MG/1
40 CAPSULE, DELAYED RELEASE ORAL DAILY PRN
Refills: 4 | COMMUNITY
Start: 2019-06-19

## 2019-07-07 RX ORDER — DICLOFENAC SODIUM 30 MG/G
GEL TOPICAL
Refills: 5 | COMMUNITY
Start: 2019-06-26

## 2019-07-07 NOTE — PROGRESS NOTES
Subjective:       Patient ID: Zhao Dodd Jr. is a 64 y.o. male  Who returns today for follow-up of his chronic medical conditions.  He is followed by Dr. Aura Perez, Endocrinologist, for type II diabetes, which remains fairly well-controlled with most recent hemoglobin A1c last week of 7.3%.      Chief Complaint: Diabetes    Diabetes   He has type 2 diabetes mellitus. No MedicAlert identification noted. Pertinent negatives for hypoglycemia include no confusion, dizziness, headaches, hunger, mood changes, nervousness/anxiousness, pallor, seizures, sleepiness, speech difficulty, sweats or tremors. Associated symptoms include polydipsia. Pertinent negatives for diabetes include no blurred vision, no chest pain, no fatigue, no foot paresthesias, no foot ulcerations, no polyphagia, no polyuria, no visual change, no weakness and no weight loss. Pertinent negatives for hypoglycemia complications include no blackouts, no hospitalization, no nocturnal hypoglycemia, no required assistance and no required glucagon injection. Symptoms are stable. Diabetic complications include impotence. Pertinent negatives for diabetic complications include no autonomic neuropathy, CVA, heart disease, nephropathy, peripheral neuropathy, PVD or retinopathy. Risk factors for coronary artery disease include dyslipidemia, obesity, diabetes mellitus, male sex and hypertension. Current diabetic treatment includes insulin injections and oral agent (triple therapy) (plus GLP1RA). He is compliant with treatment all of the time. He is currently taking insulin at bedtime. Insulin injections are given by patient. Rotation sites for injection include the abdominal wall. His weight is stable. He is following a diabetic diet. Meal planning includes avoidance of concentrated sweets. He has not had a previous visit with a dietitian. He participates in exercise daily. He monitors blood glucose at home 3-4 x per day. He monitors urine at home <1 x per  month. Blood glucose monitoring compliance is excellent. His home blood glucose trend is fluctuating minimally. An ACE inhibitor/angiotensin II receptor blocker is being taken. He does not see a podiatrist.Eye exam is current.        Patient Active Problem List   Diagnosis    Diabetic peripheral neuropathy associated with type 2 diabetes mellitus    Hypertension associated with diabetes    Hyperlipidemia associated with type 2 diabetes mellitus    Hypogonadism, male    AR (allergic rhinitis)    Vitamin D deficiency    Obesity (BMI 30-39.9)    Type 2 diabetes mellitus with diabetic polyneuropathy    Personal history of colonic polyps    Long-term current use of testosterone replacement therapy       Past Surgical History:   Procedure Laterality Date    CATARACT EXTRACTION      COLONOSCOPY      COLONOSCOPY N/A 12/27/2013    Performed by Елена Agosto [facility] at [facility]    COLONOSCOPY/Clenpiq N/A 3/13/2019    Performed by Alona Shaw MD at [facility] ENDO    ELBOW SURGERY      ESOPHAGOGASTRODUODENOSCOPY      HERNIA REPAIR      umbicial and inguinal    KNEE SURGERY      Spinal injections           Current Outpatient Medications:     aspirin 81 mg Tab, Take 1 tablet by mouth., Disp: , Rfl:     canagliflozin (INVOKANA) 300 mg Tab tablet, Take 1 tablet (300 mg total) by mouth once daily., Disp: 90 tablet, Rfl: 3    diclofenac sodium (SOLARAZE) 3 % gel, APPLY 2.0 GRAMS(2 PUMPS)TO THE AFFECTED AREAS UP TO 5 TIMES DAILY (10 GRAMS PER DAY), Disp: , Rfl: 5    ergocalciferol (ERGOCALCIFEROL) 50,000 unit Cap, TAKE 1 CAPSULE BY MOUTH EVERY 7 DAYS, Disp: 12 capsule, Rfl: 0    exenatide microspheres (BYDUREON) 2 mg/0.65 mL PnIj, Inject 2 mg into the skin every 7 days., Disp: 12 each, Rfl: 3    ezetimibe-simvastatin 10-40 mg (VYTORIN) 10-40 mg per tablet, TAKE 1 TABLET BY MOUTH NIGHTLY, Disp: 90 tablet, Rfl: 3    GEL-SELWYN 0.4 % Gel, As directed, Disp: , Rfl: 0    glimepiride (AMARYL) 1 MG tablet,  "Take 1 tablet (1 mg total) by mouth daily with dinner or evening meal., Disp: 90 tablet, Rfl: 3    HYDROcodone-acetaminophen (NORCO)  mg per tablet, TK 1 T PO  Q 4 TO 6 H, Disp: , Rfl: 0    insulin lispro (HUMALOG KWIKPEN INSULIN) 100 unit/mL pen, SLIDING SCALE AS NEEDED/up to 15  units with each meal, Disp: 15 mL, Rfl: 3    ketoconazole (NIZORAL) 2 % cream, APPLY TO AA BID, Disp: , Rfl: 0    lisinopril 10 MG tablet, TAKE 1 TABLET BY MOUTH EVERY DAY, Disp: 90 tablet, Rfl: 3    LYRICA 150 mg capsule, Take 1 capsule by mouth 2 (two) times daily., Disp: , Rfl: 5    metFORMIN (GLUCOPHAGE) 1000 MG tablet, Take 1 tablet (1,000 mg total) by mouth 2 (two) times daily with meals., Disp: 180 tablet, Rfl: 3    omega-3 fatty acids-vitamin E (FISH OIL) 1,000 mg Cap, Take 1,000 mg by mouth Twice daily., Disp: , Rfl:     omeprazole (PRILOSEC) 40 MG capsule, Take 40 mg by mouth daily as needed., Disp: , Rfl: 4    ONETOUCH VERIO Strp, USE TO TEST BLOOD SUGAR THREE TIMES DAILY, Disp: 100 strip, Rfl: 11    oxiconazole (OXISTAT) 1 % Crea, APPLY AA BID, Disp: , Rfl: 2    pen needle, diabetic (BD ULTRA-FINE MINI PEN NEEDLE) 31 gauge x 3/16" Ndle, USE AS DIRECTED WITH HUMALG PEN, Disp: 100 each, Rfl: 11    tadalafil (CIALIS) 10 MG tablet, TAKE 1 TABLET BY MOUTH AS NEEDED, Disp: 10 tablet, Rfl: 5    testosterone (TESTIM) 50 mg/5 gram (1 %) Gel, APPLY 5 GRAMS TOPICALLY ONCE DAILY, Disp: 150 g, Rfl: 5    zonisamide (ZONEGRAN) 50 MG Cap, TK ONE C PO TID, Disp: , Rfl: 2    CLENPIQ 10 mg-3.5 gram -12 gram/160 mL Soln, TK UTD, Disp: , Rfl: 0    Review of patient's allergies indicates:   Allergen Reactions    Penicillins      Other reaction(s): Unknown       Family History   Problem Relation Age of Onset    Diabetes Father     Heart attack Father     Diabetes Mother     Cancer Mother         pancreas/kidney/duodenum/colon    Diabetes Paternal Grandmother        Social History     Socioeconomic History    Marital " status:      Spouse name: Not on file    Number of children: Not on file    Years of education: Not on file    Highest education level: Not on file   Occupational History    Occupation:      Employer: [employer] DEPARTMENT     Comment: retired   Social Needs    Financial resource strain: Not on file    Food insecurity:     Worry: Not on file     Inability: Not on file    Transportation needs:     Medical: Not on file     Non-medical: Not on file   Tobacco Use    Smoking status: Never Smoker    Smokeless tobacco: Never Used   Substance and Sexual Activity    Alcohol use: Yes     Comment: maybe 2 drinks a month    Drug use: No    Sexual activity: Yes     Partners: Female   Lifestyle    Physical activity:     Days per week: Not on file     Minutes per session: Not on file    Stress: Not on file   Relationships    Social connections:     Talks on phone: Not on file     Gets together: Not on file     Attends Sikh service: Not on file     Active member of club or organization: Not on file     Attends meetings of clubs or organizations: Not on file     Relationship status: Not on file   Other Topics Concern    Not on file   Social History Narrative    The patient does exercise regularly (walks 5 days/week).      He is not satisfied with weight.    Rates diet as good.    He does drink at least 1/2 gallon water daily.    He drinks 4 coffee/tea/caffeine-containing soft drinks daily.    Total sleep time at night is 7-8 hours.    He does wear seat belts.    Hobbies include none.       Patient Care Team:  Gopal Chavez Jr., MD as PCP - General (Family Medicine)  Tyler Fishman MD (Pain Medicine)  Aura Perez MD as Consulting Physician (Endocrinology)  Sukhdeep Nair MD as Consulting Physician (Dermatology)  Nickie Kramer LPN as Care Coordinator      Review of Systems   Constitutional: Negative for fatigue, unexpected weight change and weight loss.   HENT: Negative for ear  discharge, ear pain, hearing loss, tinnitus and voice change.    Eyes: Negative for blurred vision and pain.   Respiratory: Negative for cough and shortness of breath.    Cardiovascular: Negative for chest pain, palpitations and leg swelling.   Gastrointestinal: Negative for abdominal pain, blood in stool, constipation, diarrhea, nausea and vomiting.   Endocrine: Positive for polydipsia. Negative for polyphagia and polyuria.   Genitourinary: Positive for impotence. Negative for decreased urine volume, difficulty urinating, dysuria, enuresis, frequency, hematuria and urgency.   Musculoskeletal: Negative for arthralgias, back pain and myalgias.   Skin: Negative for pallor and rash.   Neurological: Negative for dizziness, tremors, seizures, speech difficulty, weakness, light-headedness and headaches.   Hematological: Does not bruise/bleed easily.   Psychiatric/Behavioral: Negative for confusion, dysphoric mood and sleep disturbance. The patient is not nervous/anxious.          Component Date Value Ref Range    Microalbum.,U,Random 07/03/2019 4.0  ug/mL    Creatinine, Random Ur 07/03/2019 88.0  23.0 - 375.0 mg/dL    Microalb Creat Ratio 07/03/2019 4.5  0.0 - 30.0 ug/mg   Component Date Value Ref Range    WBC 07/03/2019 7.96  3.90 - 12.70 K/uL    RBC 07/03/2019 5.46  4.60 - 6.20 M/uL    Hemoglobin 07/03/2019 13.1* 14.0 - 18.0 g/dL    Hematocrit 07/03/2019 44.2  40.0 - 54.0 %    Mean Corpuscular Volume 07/03/2019 81* 82 - 98 fL    Mean Corpuscular Hemoglobin 07/03/2019 24.0* 27.0 - 31.0 pg    Mean Corpuscular Hgb Conc 07/03/2019 29.6* 32.0 - 36.0 g/dL    RDW 07/03/2019 17.2* 11.5 - 14.5 %    Platelets 07/03/2019 306  150 - 350 K/uL    MPV 07/03/2019 11.3  9.2 - 12.9 fL    Immature Granulocytes 07/03/2019 0.3  0.0 - 0.5 %    Gran # (ANC) 07/03/2019 4.6  1.8 - 7.7 K/uL    Immature Grans (Abs) 07/03/2019 0.02  0.00 - 0.04 K/uL    Lymph # 07/03/2019 1.8  1.0 - 4.8 K/uL    Mono # 07/03/2019 0.8  0.3 - 1.0  "K/uL    Eos # 07/03/2019 0.6* 0.0 - 0.5 K/uL    Baso # 07/03/2019 0.10  0.00 - 0.20 K/uL    nRBC 07/03/2019 0  0 /100 WBC    Gran% 07/03/2019 57.1  38.0 - 73.0 %    Lymph% 07/03/2019 22.9  18.0 - 48.0 %    Mono% 07/03/2019 10.6  4.0 - 15.0 %    Eosinophil% 07/03/2019 7.8  0.0 - 8.0 %    Basophil% 07/03/2019 1.3  0.0 - 1.9 %    Differential Method 07/03/2019 Automated     Sodium 07/03/2019 138  136 - 145 mmol/L    Potassium 07/03/2019 4.4  3.5 - 5.1 mmol/L    Chloride 07/03/2019 105  95 - 110 mmol/L    CO2 07/03/2019 22* 23 - 29 mmol/L    Glucose 07/03/2019 143* 70 - 110 mg/dL    BUN, Bld 07/03/2019 15  8 - 23 mg/dL    Creatinine 07/03/2019 1.0  0.5 - 1.4 mg/dL    Calcium 07/03/2019 9.4  8.7 - 10.5 mg/dL    Total Protein 07/03/2019 6.9  6.0 - 8.4 g/dL    Albumin 07/03/2019 4.2  3.5 - 5.2 g/dL    Total Bilirubin 07/03/2019 0.2  0.1 - 1.0 mg/dL    Alkaline Phosphatase 07/03/2019 59  55 - 135 U/L    AST 07/03/2019 21  10 - 40 U/L    ALT 07/03/2019 9* 10 - 44 U/L    Anion Gap 07/03/2019 11  8 - 16 mmol/L    eGFR if African American 07/03/2019 >60.0  >60 mL/min/1.73 m^2    eGFR if non African American 07/03/2019 >60.0  >60 mL/min/1.73 m^2    Cholesterol 07/03/2019 135  120 - 199 mg/dL    Triglycerides 07/03/2019 140  30 - 150 mg/dL    HDL 07/03/2019 45  40 - 75 mg/dL    LDL Cholesterol 07/03/2019 62.0* 63.0 - 159.0 mg/dL    Hdl/Cholesterol Ratio 07/03/2019 33.3  20.0 - 50.0 %    Total Cholesterol/HDL Ratio 07/03/2019 3.0  2.0 - 5.0    Non-HDL Cholesterol 07/03/2019 90  mg/dL    Hemoglobin A1C 07/03/2019 7.3* 4.0 - 5.6 %    Estimated Avg Glucose 07/03/2019 163* 68 - 131 mg/dL    Vit D, 25-Hydroxy 07/03/2019 36  30 - 96 ng/mL    Testosterone, Total 07/03/2019 434  304 - 1227 ng/dL    PSA DIAGNOSTIC 07/03/2019 1.0  0.00 - 4.00 ng/mL       Objective:      /60 (BP Location: Left arm, Patient Position: Sitting, BP Method: Large (Manual))   Pulse 102   Ht 6' 2" (1.88 m)   Wt 111.1 " kg (245 lb)   SpO2 97%   BMI 31.46 kg/m²     Physical Exam   Constitutional: He is oriented to person, place, and time. He appears well-developed and well-nourished. He is cooperative.   HENT:   Head: Normocephalic and atraumatic.   Right Ear: External ear normal.   Left Ear: External ear normal.   Nose: Nose normal.   Mouth/Throat: Oropharynx is clear and moist and mucous membranes are normal. No oropharyngeal exudate.   Eyes: Conjunctivae are normal. No scleral icterus.   Neck: Neck supple. No JVD present. Carotid bruit is not present. No thyromegaly present.   Cardiovascular: Normal rate, regular rhythm, normal heart sounds and normal pulses. Exam reveals no gallop and no friction rub.   No murmur heard.  Pulmonary/Chest: Effort normal and breath sounds normal. He has no wheezes. He has no rhonchi. He has no rales.   Abdominal: Soft. Bowel sounds are normal. He exhibits no distension and no mass. There is no splenomegaly or hepatomegaly. There is no tenderness.   Musculoskeletal: Normal range of motion. He exhibits no edema or tenderness.   Lymphadenopathy:     He has no cervical adenopathy.     He has no axillary adenopathy.   Neurological: He is alert and oriented to person, place, and time. He has normal strength and normal reflexes. No cranial nerve deficit or sensory deficit. Coordination normal.   Skin: Skin is warm and dry.   Psychiatric: He has a normal mood and affect.   Vitals reviewed.      Assessment:       1. Type 2 diabetes mellitus with diabetic polyneuropathy, without long-term current use of insulin    2. Diabetic peripheral neuropathy associated with type 2 diabetes mellitus    3. Hypertension associated with diabetes    4. Hyperlipidemia associated with type 2 diabetes mellitus    5. Hypogonadism, male    6. Long-term current use of testosterone replacement therapy    7. Obesity (BMI 30-39.9)    8. Vitamin D deficiency        Plan:       Reviewed lab results.  Offer pneumococcal and shingles  "vaccine.  Eye exam scheduled in 2 days.  Discussed better adherence to diet.  RTC 6 months.        "This note will not be shared with the patient."  "

## 2019-07-08 ENCOUNTER — OFFICE VISIT (OUTPATIENT)
Dept: FAMILY MEDICINE | Facility: CLINIC | Age: 64
End: 2019-07-08
Attending: FAMILY MEDICINE
Payer: MEDICARE

## 2019-07-08 VITALS
HEART RATE: 102 BPM | BODY MASS INDEX: 31.44 KG/M2 | SYSTOLIC BLOOD PRESSURE: 108 MMHG | WEIGHT: 245 LBS | DIASTOLIC BLOOD PRESSURE: 60 MMHG | OXYGEN SATURATION: 97 % | HEIGHT: 74 IN

## 2019-07-08 DIAGNOSIS — E11.69 HYPERLIPIDEMIA ASSOCIATED WITH TYPE 2 DIABETES MELLITUS: ICD-10-CM

## 2019-07-08 DIAGNOSIS — I15.2 HYPERTENSION ASSOCIATED WITH DIABETES: ICD-10-CM

## 2019-07-08 DIAGNOSIS — E11.42 DIABETIC PERIPHERAL NEUROPATHY ASSOCIATED WITH TYPE 2 DIABETES MELLITUS: ICD-10-CM

## 2019-07-08 DIAGNOSIS — E11.59 HYPERTENSION ASSOCIATED WITH DIABETES: ICD-10-CM

## 2019-07-08 DIAGNOSIS — E66.9 OBESITY (BMI 30-39.9): ICD-10-CM

## 2019-07-08 DIAGNOSIS — E78.5 HYPERLIPIDEMIA ASSOCIATED WITH TYPE 2 DIABETES MELLITUS: ICD-10-CM

## 2019-07-08 DIAGNOSIS — E11.42 TYPE 2 DIABETES MELLITUS WITH DIABETIC POLYNEUROPATHY, WITHOUT LONG-TERM CURRENT USE OF INSULIN: Primary | ICD-10-CM

## 2019-07-08 DIAGNOSIS — E29.1 HYPOGONADISM, MALE: ICD-10-CM

## 2019-07-08 DIAGNOSIS — Z79.890 LONG-TERM CURRENT USE OF TESTOSTERONE REPLACEMENT THERAPY: ICD-10-CM

## 2019-07-08 DIAGNOSIS — E55.9 VITAMIN D DEFICIENCY: ICD-10-CM

## 2019-07-08 PROCEDURE — 99999 PR PBB SHADOW E&M-EST. PATIENT-LVL III: CPT | Mod: PBBFAC,,, | Performed by: FAMILY MEDICINE

## 2019-07-08 PROCEDURE — 99213 OFFICE O/P EST LOW 20 MIN: CPT | Mod: PBBFAC,PO,25 | Performed by: FAMILY MEDICINE

## 2019-07-08 PROCEDURE — G0009 ADMIN PNEUMOCOCCAL VACCINE: HCPCS | Mod: PBBFAC,PO

## 2019-07-08 PROCEDURE — 99999 PR PBB SHADOW E&M-EST. PATIENT-LVL III: ICD-10-PCS | Mod: PBBFAC,,, | Performed by: FAMILY MEDICINE

## 2019-07-08 PROCEDURE — 99214 PR OFFICE/OUTPT VISIT, EST, LEVL IV, 30-39 MIN: ICD-10-PCS | Mod: S$PBB,25,, | Performed by: FAMILY MEDICINE

## 2019-07-08 PROCEDURE — 99214 OFFICE O/P EST MOD 30 MIN: CPT | Mod: S$PBB,25,, | Performed by: FAMILY MEDICINE

## 2019-08-02 DIAGNOSIS — E11.9 TYPE 2 DIABETES MELLITUS WITHOUT COMPLICATION, WITH LONG-TERM CURRENT USE OF INSULIN: ICD-10-CM

## 2019-08-02 DIAGNOSIS — Z79.4 TYPE 2 DIABETES MELLITUS WITHOUT COMPLICATION, WITH LONG-TERM CURRENT USE OF INSULIN: ICD-10-CM

## 2019-08-02 RX ORDER — METFORMIN HYDROCHLORIDE 1000 MG/1
TABLET ORAL
Qty: 180 TABLET | Refills: 3 | Status: SHIPPED | OUTPATIENT
Start: 2019-08-02 | End: 2020-05-29

## 2019-08-20 ENCOUNTER — PATIENT MESSAGE (OUTPATIENT)
Dept: ENDOCRINOLOGY | Facility: CLINIC | Age: 64
End: 2019-08-20

## 2019-08-20 DIAGNOSIS — E11.42 TYPE 2 DIABETES MELLITUS WITH DIABETIC POLYNEUROPATHY, WITHOUT LONG-TERM CURRENT USE OF INSULIN: ICD-10-CM

## 2019-08-20 RX ORDER — INSULIN LISPRO 100 [IU]/ML
INJECTION, SOLUTION INTRAVENOUS; SUBCUTANEOUS
Qty: 15 ML | Refills: 3 | Status: SHIPPED | OUTPATIENT
Start: 2019-08-20 | End: 2020-03-30

## 2019-09-22 DIAGNOSIS — E55.9 VITAMIN D DEFICIENCY: ICD-10-CM

## 2019-09-23 RX ORDER — ERGOCALCIFEROL 1.25 MG/1
CAPSULE ORAL
Qty: 12 CAPSULE | Refills: 0 | Status: SHIPPED | OUTPATIENT
Start: 2019-09-23 | End: 2019-12-15 | Stop reason: SDUPTHER

## 2019-10-08 ENCOUNTER — PATIENT MESSAGE (OUTPATIENT)
Dept: ENDOCRINOLOGY | Facility: CLINIC | Age: 64
End: 2019-10-08

## 2019-10-08 DIAGNOSIS — E78.5 HYPERLIPIDEMIA ASSOCIATED WITH TYPE 2 DIABETES MELLITUS: ICD-10-CM

## 2019-10-08 DIAGNOSIS — E11.69 HYPERLIPIDEMIA ASSOCIATED WITH TYPE 2 DIABETES MELLITUS: ICD-10-CM

## 2019-10-08 RX ORDER — EZETIMIBE AND SIMVASTATIN 10; 40 MG/1; MG/1
1 TABLET ORAL NIGHTLY
Qty: 90 TABLET | Refills: 0 | Status: SHIPPED | OUTPATIENT
Start: 2019-10-08 | End: 2019-12-30 | Stop reason: SDUPTHER

## 2019-10-08 NOTE — TELEPHONE ENCOUNTER
----- Message from Jo Easley sent at 10/8/2019 10:31 AM CDT -----  Contact: Patient  Need Advice:      Reason For Call: Patient would like to schedule appt. For December. Also need a refill   ezetimibe-simvastatin 10-40 mg (VYTORIN) 10-40 mg per tablet      Communication Preference: [phone]      Additional Information:

## 2019-10-14 ENCOUNTER — TELEPHONE (OUTPATIENT)
Dept: ENDOCRINOLOGY | Facility: CLINIC | Age: 64
End: 2019-10-14

## 2019-10-14 DIAGNOSIS — E11.42 TYPE 2 DIABETES MELLITUS WITH DIABETIC POLYNEUROPATHY, WITHOUT LONG-TERM CURRENT USE OF INSULIN: Primary | ICD-10-CM

## 2019-10-25 DIAGNOSIS — E11.42 TYPE 2 DIABETES MELLITUS WITH DIABETIC POLYNEUROPATHY, WITHOUT LONG-TERM CURRENT USE OF INSULIN: ICD-10-CM

## 2019-10-28 ENCOUNTER — PATIENT MESSAGE (OUTPATIENT)
Dept: ENDOCRINOLOGY | Facility: CLINIC | Age: 64
End: 2019-10-28

## 2019-10-28 DIAGNOSIS — E11.42 TYPE 2 DIABETES MELLITUS WITH DIABETIC POLYNEUROPATHY, WITHOUT LONG-TERM CURRENT USE OF INSULIN: ICD-10-CM

## 2019-10-29 DIAGNOSIS — E11.42 TYPE 2 DIABETES MELLITUS WITH DIABETIC POLYNEUROPATHY, WITHOUT LONG-TERM CURRENT USE OF INSULIN: ICD-10-CM

## 2019-10-29 RX ORDER — GLIMEPIRIDE 1 MG/1
TABLET ORAL
Qty: 90 TABLET | Refills: 0 | Status: SHIPPED | OUTPATIENT
Start: 2019-10-29 | End: 2020-05-29

## 2019-11-01 DIAGNOSIS — E11.42 TYPE 2 DIABETES MELLITUS WITH DIABETIC POLYNEUROPATHY, WITHOUT LONG-TERM CURRENT USE OF INSULIN: ICD-10-CM

## 2019-11-01 DIAGNOSIS — Z79.4 TYPE 2 DIABETES MELLITUS WITHOUT COMPLICATION, WITH LONG-TERM CURRENT USE OF INSULIN: ICD-10-CM

## 2019-11-01 DIAGNOSIS — E11.9 TYPE 2 DIABETES MELLITUS WITHOUT COMPLICATION, WITH LONG-TERM CURRENT USE OF INSULIN: ICD-10-CM

## 2019-11-01 RX ORDER — GLIMEPIRIDE 1 MG/1
TABLET ORAL
Qty: 90 TABLET | Refills: 0 | Status: SHIPPED | OUTPATIENT
Start: 2019-11-01 | End: 2020-04-27

## 2019-11-01 RX ORDER — BLOOD-GLUCOSE METER
EACH MISCELLANEOUS
Qty: 300 STRIP | Refills: 0 | Status: SHIPPED | OUTPATIENT
Start: 2019-11-01 | End: 2020-03-24

## 2019-11-01 RX ORDER — BLOOD-GLUCOSE METER
EACH MISCELLANEOUS
Qty: 100 STRIP | Refills: 0 | Status: SHIPPED | OUTPATIENT
Start: 2019-11-01 | End: 2020-05-29

## 2019-11-02 DIAGNOSIS — E11.9 TYPE 2 DIABETES MELLITUS WITHOUT COMPLICATION, WITH LONG-TERM CURRENT USE OF INSULIN: ICD-10-CM

## 2019-11-02 DIAGNOSIS — Z79.4 TYPE 2 DIABETES MELLITUS WITHOUT COMPLICATION, WITH LONG-TERM CURRENT USE OF INSULIN: ICD-10-CM

## 2019-11-04 RX ORDER — METFORMIN HYDROCHLORIDE 1000 MG/1
TABLET ORAL
Qty: 180 TABLET | Refills: 0 | Status: SHIPPED | OUTPATIENT
Start: 2019-11-04 | End: 2020-02-04

## 2019-11-22 ENCOUNTER — TELEPHONE (OUTPATIENT)
Dept: FAMILY MEDICINE | Facility: CLINIC | Age: 64
End: 2019-11-22

## 2019-11-22 RX ORDER — HYDROCODONE BITARTRATE AND ACETAMINOPHEN 7.5; 325 MG/1; MG/1
TABLET ORAL
Refills: 0 | COMMUNITY
Start: 2019-11-01

## 2019-11-22 RX ORDER — PROMETHAZINE HYDROCHLORIDE AND CODEINE PHOSPHATE 6.25; 1 MG/5ML; MG/5ML
SOLUTION ORAL
Refills: 0 | COMMUNITY
Start: 2019-08-16 | End: 2019-08-23

## 2019-11-22 RX ORDER — NAPROXEN SODIUM 375 MG/1
TABLET, FILM COATED, EXTENDED RELEASE ORAL
Refills: 1 | COMMUNITY
Start: 2019-11-04

## 2019-11-22 RX ORDER — DOXYCYCLINE 100 MG/1
CAPSULE ORAL
Refills: 0 | COMMUNITY
Start: 2019-08-16 | End: 2019-08-26

## 2019-11-22 RX ORDER — DEXAMETHASONE 4 MG/1
TABLET ORAL
Refills: 0 | COMMUNITY
Start: 2019-11-20 | End: 2019-11-22

## 2019-11-22 NOTE — TELEPHONE ENCOUNTER
----- Message from Janet Guillen sent at 11/22/2019 10:43 AM CST -----  Contact: EKTA PETERSON JR. [[id number]]  Name of Who is Calling :  EKTA PETERSON JR. [[id number]]    What is the request in detail :    Patient is requesting a call from staff in regards to getting orders for annual blood work and scheduling before his scheduled appointment  .....Please contact to further discuss and advise.    Can the clinic reply by [facility] :   Yes     What Number to Call Back : [phone]

## 2019-11-22 NOTE — TELEPHONE ENCOUNTER
Please Informpatient that he is only due for a HgbA1c, and that has already been scheduled for January 23, 2020 lab appt.    However, remind him that he is due for an influenza vaccine, as well as an annual foot and eye exam.

## 2019-11-27 DIAGNOSIS — E11.42 TYPE 2 DIABETES MELLITUS WITH DIABETIC POLYNEUROPATHY, WITHOUT LONG-TERM CURRENT USE OF INSULIN: ICD-10-CM

## 2019-11-27 RX ORDER — CANAGLIFLOZIN 300 MG/1
TABLET, FILM COATED ORAL
Qty: 90 TABLET | Refills: 0 | Status: SHIPPED | OUTPATIENT
Start: 2019-11-27 | End: 2020-02-26

## 2019-12-02 DIAGNOSIS — E11.9 TYPE 2 DIABETES MELLITUS WITHOUT COMPLICATION, UNSPECIFIED WHETHER LONG TERM INSULIN USE: ICD-10-CM

## 2019-12-03 RX ORDER — AMOXICILLIN 500 MG/1
CAPSULE ORAL
Refills: 0 | COMMUNITY
Start: 2019-11-26 | End: 2019-12-01

## 2019-12-04 ENCOUNTER — PATIENT MESSAGE (OUTPATIENT)
Dept: FAMILY MEDICINE | Facility: CLINIC | Age: 64
End: 2019-12-04

## 2019-12-04 ENCOUNTER — OFFICE VISIT (OUTPATIENT)
Dept: FAMILY MEDICINE | Facility: CLINIC | Age: 64
End: 2019-12-04
Attending: FAMILY MEDICINE
Payer: MEDICARE

## 2019-12-04 VITALS
DIASTOLIC BLOOD PRESSURE: 60 MMHG | OXYGEN SATURATION: 97 % | HEART RATE: 90 BPM | HEIGHT: 74 IN | BODY MASS INDEX: 30.42 KG/M2 | SYSTOLIC BLOOD PRESSURE: 120 MMHG | WEIGHT: 237 LBS

## 2019-12-04 DIAGNOSIS — I15.2 HYPERTENSION ASSOCIATED WITH DIABETES: ICD-10-CM

## 2019-12-04 DIAGNOSIS — E11.42 DIABETIC PERIPHERAL NEUROPATHY ASSOCIATED WITH TYPE 2 DIABETES MELLITUS: ICD-10-CM

## 2019-12-04 DIAGNOSIS — E29.1 HYPOGONADISM, MALE: ICD-10-CM

## 2019-12-04 DIAGNOSIS — Z86.010 PERSONAL HISTORY OF COLONIC POLYPS: ICD-10-CM

## 2019-12-04 DIAGNOSIS — E11.69 HYPERLIPIDEMIA ASSOCIATED WITH TYPE 2 DIABETES MELLITUS: ICD-10-CM

## 2019-12-04 DIAGNOSIS — E11.42 TYPE 2 DIABETES MELLITUS WITH DIABETIC POLYNEUROPATHY, WITHOUT LONG-TERM CURRENT USE OF INSULIN: Primary | ICD-10-CM

## 2019-12-04 DIAGNOSIS — E66.09 CLASS 1 OBESITY DUE TO EXCESS CALORIES WITH SERIOUS COMORBIDITY AND BODY MASS INDEX (BMI) OF 31.0 TO 31.9 IN ADULT: ICD-10-CM

## 2019-12-04 DIAGNOSIS — E78.5 HYPERLIPIDEMIA ASSOCIATED WITH TYPE 2 DIABETES MELLITUS: ICD-10-CM

## 2019-12-04 DIAGNOSIS — E11.59 HYPERTENSION ASSOCIATED WITH DIABETES: ICD-10-CM

## 2019-12-04 PROCEDURE — 99214 PR OFFICE/OUTPT VISIT, EST, LEVL IV, 30-39 MIN: ICD-10-PCS | Mod: S$PBB,,, | Performed by: FAMILY MEDICINE

## 2019-12-04 PROCEDURE — 99999 PR PBB SHADOW E&M-EST. PATIENT-LVL III: ICD-10-PCS | Mod: PBBFAC,,, | Performed by: FAMILY MEDICINE

## 2019-12-04 PROCEDURE — 99999 PR PBB SHADOW E&M-EST. PATIENT-LVL III: CPT | Mod: PBBFAC,,, | Performed by: FAMILY MEDICINE

## 2019-12-04 PROCEDURE — 99213 OFFICE O/P EST LOW 20 MIN: CPT | Mod: PBBFAC,PO | Performed by: FAMILY MEDICINE

## 2019-12-04 PROCEDURE — 99214 OFFICE O/P EST MOD 30 MIN: CPT | Mod: S$PBB,,, | Performed by: FAMILY MEDICINE

## 2019-12-04 PROCEDURE — 90662 IIV NO PRSV INCREASED AG IM: CPT | Mod: PBBFAC,PO

## 2019-12-04 RX ORDER — MELOXICAM 15 MG/1
15 TABLET ORAL
COMMUNITY
End: 2020-05-29

## 2019-12-04 NOTE — PROGRESS NOTES
Subjective:       Patient ID: Zhao Dodd Jr. is a 64 y.o. male.    Chief Complaint: Diabetes    HPI     The patient returns today for follow-up, 5 months after his last visit.  He is also scheduled to see the endocrinology nurse practitioner next month.  He is presently asymptomatic.  His capillary blood glucose values at home have been ranging from 120s-140s.  He need SSI ~ BIW.  He exercises 5 days weekly.    Patient Active Problem List   Diagnosis    Diabetic peripheral neuropathy associated with type 2 diabetes mellitus    Hypertension associated with diabetes    Hyperlipidemia associated with type 2 diabetes mellitus    Hypogonadism, male    AR (allergic rhinitis)    Vitamin D deficiency    Class 1 obesity due to excess calories with serious comorbidity and body mass index (BMI) of 31.0 to 31.9 in adult    Type 2 diabetes mellitus with diabetic polyneuropathy    Personal history of colonic polyps    Long-term current use of testosterone replacement therapy       Current Outpatient Medications:     aspirin 81 mg Tab, Take 1 tablet by mouth., Disp: , Rfl:     diclofenac sodium (SOLARAZE) 3 % gel, APPLY 2.0 GRAMS(2 PUMPS)TO THE AFFECTED AREAS UP TO 5 TIMES DAILY (10 GRAMS PER DAY), Disp: , Rfl: 5    ergocalciferol (ERGOCALCIFEROL) 50,000 unit Cap, TAKE 1 CAPSULE BY MOUTH EVERY 7 DAYS, Disp: 12 capsule, Rfl: 0    exenatide microspheres (BYDUREON) 2 mg/0.65 mL PnIj, Inject 2 mg into the skin every 7 days., Disp: 12 each, Rfl: 3    ezetimibe-simvastatin 10-40 mg (VYTORIN) 10-40 mg per tablet, Take 1 tablet by mouth nightly., Disp: 90 tablet, Rfl: 0    glimepiride (AMARYL) 1 MG tablet, TAKE 1 TABLET BY MOUTH DAILY WITH DINNER OR THE EVENING MEAL, Disp: 90 tablet, Rfl: 0    HYDROcodone-acetaminophen (NORCO) 7.5-325 mg per tablet, TK 1 T PO  Q 6 TO 8 H PRN, Disp: , Rfl: 0    insulin lispro (HUMALOG KWIKPEN INSULIN) 100 unit/mL pen, SLIDING SCALE AS NEEDED/up to 15  units with each meal, Disp: 15  "mL, Rfl: 3    INVOKANA 300 mg Tab tablet, TAKE 1 TABLET(300 MG) BY MOUTH EVERY DAY, Disp: 90 tablet, Rfl: 0    ketoconazole (NIZORAL) 2 % cream, APPLY TO AA BID, Disp: , Rfl: 0    lisinopril 10 MG tablet, TAKE 1 TABLET BY MOUTH EVERY DAY, Disp: 90 tablet, Rfl: 3    LYRICA 150 mg capsule, Take 1 capsule by mouth 2 (two) times daily., Disp: , Rfl: 5    metFORMIN (GLUCOPHAGE) 1000 MG tablet, TAKE 1 TABLET(1000 MG) BY MOUTH TWICE DAILY WITH MEALS, Disp: 180 tablet, Rfl: 3    omega-3 fatty acids-vitamin E (FISH OIL) 1,000 mg Cap, Take 1,000 mg by mouth Twice daily., Disp: , Rfl:     omeprazole (PRILOSEC) 40 MG capsule, Take 40 mg by mouth daily as needed., Disp: , Rfl: 4    ONETOUCH VERIO Strp, USE TO TEST BLOOD SUGAR THREE TIMES DAILY, Disp: 100 strip, Rfl: 0    ONETOUCH VERIO Strp, TEST BLOOD SUGAR THREE TIMES DAILY AS DIRECTED, Disp: 300 strip, Rfl: 0    oxiconazole (OXISTAT) 1 % Crea, APPLY AA BID, Disp: , Rfl: 2    pen needle, diabetic (BD ULTRA-FINE MINI PEN NEEDLE) 31 gauge x 3/16" Ndle, USE AS DIRECTED WITH HUMALG PEN, Disp: 100 each, Rfl: 11    tadalafil (CIALIS) 10 MG tablet, TAKE 1 TABLET BY MOUTH AS NEEDED, Disp: 10 tablet, Rfl: 5    testosterone (TESTIM) 50 mg/5 gram (1 %) Gel, APPLY 5 GRAMS TOPICALLY ONCE DAILY, Disp: 150 g, Rfl: 5    zonisamide (ZONEGRAN) 50 MG Cap, TK ONE C PO TID, Disp: , Rfl: 2    CLENPIQ 10 mg-3.5 gram -12 gram/160 mL Soln, TK UTD, Disp: , Rfl: 0    GEL-SELWYN 0.4 % Gel, As directed, Disp: , Rfl: 0    glimepiride (AMARYL) 1 MG tablet, TAKE 1 TABLET(1 MG) BY MOUTH DAILY WITH DINNER OR EVENING MEAL, Disp: 90 tablet, Rfl: 0    meloxicam (MOBIC) 15 MG tablet, Take 15 mg by mouth., Disp: , Rfl:     metFORMIN (GLUCOPHAGE) 1000 MG tablet, TAKE 1 TABLET(1000 MG) BY MOUTH TWICE DAILY WITH MEALS, Disp: 180 tablet, Rfl: 0    naproxen sodium 375 mg TM24, TAKE 1 TO 3 TABLETS BY MOUTH ONCE DAILY, Disp: , Rfl: 1    The following portions of the patient's history were reviewed and " "updated as appropriate: allergies, past family history, past medical history, past social history and past surgical history.    Review of Systems   Constitutional: Negative for fatigue and unexpected weight change.   HENT: Negative for ear discharge, ear pain, hearing loss, tinnitus and voice change.    Eyes: Negative for pain.   Respiratory: Negative for cough and shortness of breath.    Cardiovascular: Negative for chest pain, palpitations and leg swelling.   Gastrointestinal: Negative for abdominal pain, blood in stool, constipation, diarrhea, nausea and vomiting.   Endocrine: Negative for polydipsia, polyphagia and polyuria.   Genitourinary: Negative for decreased urine volume, difficulty urinating, dysuria, enuresis, frequency, hematuria and urgency.   Musculoskeletal: Negative for arthralgias, back pain and myalgias.   Skin: Negative for pallor and rash.   Neurological: Negative for dizziness, tremors, seizures, speech difficulty, weakness, light-headedness and headaches.   Hematological: Does not bruise/bleed easily.   Psychiatric/Behavioral: Negative for confusion, dysphoric mood and sleep disturbance. The patient is not nervous/anxious.        Objective:      /60   Pulse 90   Ht 6' 2" (1.88 m)   Wt 107.5 kg (237 lb)   SpO2 97%   BMI 30.43 kg/m²     Physical Exam   Constitutional: He is oriented to person, place, and time. He appears well-developed and well-nourished. He is cooperative.   HENT:   Head: Normocephalic and atraumatic.   Right Ear: External ear normal.   Left Ear: External ear normal.   Nose: Nose normal.   Mouth/Throat: Oropharynx is clear and moist and mucous membranes are normal. No oropharyngeal exudate.   Eyes: Conjunctivae are normal. No scleral icterus.   Neck: Neck supple. No JVD present. Carotid bruit is not present. No thyromegaly present.   Cardiovascular: Normal rate, regular rhythm, normal heart sounds and normal pulses. Exam reveals no gallop and no friction rub.   No " murmur heard.  Pulses:       Dorsalis pedis pulses are 2+ on the right side, and 2+ on the left side.        Posterior tibial pulses are 2+ on the right side, and 2+ on the left side.   Pulmonary/Chest: Effort normal and breath sounds normal. He has no wheezes. He has no rhonchi. He has no rales.   Abdominal: Soft. Bowel sounds are normal. He exhibits no distension and no mass. There is no splenomegaly or hepatomegaly. There is no tenderness.   Musculoskeletal: Normal range of motion. He exhibits no edema or tenderness.        Right foot: There is normal range of motion and no deformity.        Left foot: There is normal range of motion and no deformity.   Feet:   Right Foot:   Protective Sensation: 9 sites tested. 9 sites sensed.   Skin Integrity: Negative for erythema, warmth or dry skin.   Left Foot:   Protective Sensation: 9 sites tested. 9 sites sensed.   Skin Integrity: Negative for erythema, callus or dry skin.   Lymphadenopathy:     He has no cervical adenopathy.     He has no axillary adenopathy.   Neurological: He is alert and oriented to person, place, and time. He has normal strength and normal reflexes. No cranial nerve deficit or sensory deficit. Coordination normal.   Skin: Skin is warm and dry.   Psychiatric: He has a normal mood and affect.   Vitals reviewed.      Assessment:       1. Type 2 diabetes mellitus with diabetic polyneuropathy, without long-term current use of insulin    2. Diabetic peripheral neuropathy associated with type 2 diabetes mellitus    3. Hypertension associated with diabetes    4. Hyperlipidemia associated with type 2 diabetes mellitus    5. Hypogonadism, male    6. Personal history of colonic polyps    7. Class 1 obesity due to excess calories with serious comorbidity and body mass index (BMI) of 31.0 to 31.9 in adult        Plan:       Offered influenza vaccine today.  Foot exam performed today.  Obtain copy of most recent eye exam (7/10/2019) from Dr. Chakraborty.  See  "Endocrinology as scheduled in January.        "This note will not be shared with the patient."  "

## 2019-12-15 DIAGNOSIS — E55.9 VITAMIN D DEFICIENCY: ICD-10-CM

## 2019-12-16 RX ORDER — ERGOCALCIFEROL 1.25 MG/1
CAPSULE ORAL
Qty: 12 CAPSULE | Refills: 3 | Status: SHIPPED | OUTPATIENT
Start: 2019-12-16 | End: 2020-11-30

## 2019-12-30 DIAGNOSIS — E11.69 HYPERLIPIDEMIA ASSOCIATED WITH TYPE 2 DIABETES MELLITUS: ICD-10-CM

## 2019-12-30 DIAGNOSIS — E78.5 HYPERLIPIDEMIA ASSOCIATED WITH TYPE 2 DIABETES MELLITUS: ICD-10-CM

## 2019-12-30 RX ORDER — EZETIMIBE AND SIMVASTATIN 10; 40 MG/1; MG/1
1 TABLET ORAL NIGHTLY
Qty: 90 TABLET | Refills: 0 | Status: SHIPPED | OUTPATIENT
Start: 2019-12-30 | End: 2020-03-26

## 2020-01-10 DIAGNOSIS — I10 ESSENTIAL HYPERTENSION: ICD-10-CM

## 2020-01-10 RX ORDER — LISINOPRIL 10 MG/1
TABLET ORAL
Qty: 90 TABLET | Refills: 3 | Status: SHIPPED | OUTPATIENT
Start: 2020-01-10 | End: 2020-12-31 | Stop reason: SDUPTHER

## 2020-01-21 ENCOUNTER — LAB VISIT (OUTPATIENT)
Dept: LAB | Facility: HOSPITAL | Age: 65
End: 2020-01-21
Attending: INTERNAL MEDICINE
Payer: MEDICARE

## 2020-01-21 DIAGNOSIS — E11.42 TYPE 2 DIABETES MELLITUS WITH DIABETIC POLYNEUROPATHY, WITHOUT LONG-TERM CURRENT USE OF INSULIN: ICD-10-CM

## 2020-01-21 LAB
ALBUMIN SERPL BCP-MCNC: 4 G/DL (ref 3.5–5.2)
ALP SERPL-CCNC: 49 U/L (ref 55–135)
ALT SERPL W/O P-5'-P-CCNC: 9 U/L (ref 10–44)
ANION GAP SERPL CALC-SCNC: 7 MMOL/L (ref 8–16)
AST SERPL-CCNC: 18 U/L (ref 10–40)
BILIRUB SERPL-MCNC: 0.2 MG/DL (ref 0.1–1)
BUN SERPL-MCNC: 11 MG/DL (ref 8–23)
CALCIUM SERPL-MCNC: 9.1 MG/DL (ref 8.7–10.5)
CHLORIDE SERPL-SCNC: 109 MMOL/L (ref 95–110)
CO2 SERPL-SCNC: 25 MMOL/L (ref 23–29)
CREAT SERPL-MCNC: 1 MG/DL (ref 0.5–1.4)
EST. GFR  (AFRICAN AMERICAN): >60 ML/MIN/1.73 M^2
EST. GFR  (NON AFRICAN AMERICAN): >60 ML/MIN/1.73 M^2
ESTIMATED AVG GLUCOSE: 166 MG/DL (ref 68–131)
GLUCOSE SERPL-MCNC: 117 MG/DL (ref 70–110)
HBA1C MFR BLD HPLC: 7.4 % (ref 4–5.6)
POTASSIUM SERPL-SCNC: 4.1 MMOL/L (ref 3.5–5.1)
PROT SERPL-MCNC: 6.5 G/DL (ref 6–8.4)
SODIUM SERPL-SCNC: 141 MMOL/L (ref 136–145)

## 2020-01-21 PROCEDURE — 83036 HEMOGLOBIN GLYCOSYLATED A1C: CPT

## 2020-01-21 PROCEDURE — 80053 COMPREHEN METABOLIC PANEL: CPT

## 2020-01-21 PROCEDURE — 36415 COLL VENOUS BLD VENIPUNCTURE: CPT | Mod: PN

## 2020-01-23 NOTE — PROGRESS NOTES
Subjective:      Patient ID: Zhao Dodd Jr. is a 64 y.o. male.    Chief Complaint:  Follow-up    History of Present Illness  Zhao Dodd Jr. presents today for follow up of Type 2 DM & hypogonadism. Previous patient of Dr. Perez.  This is his first visit with me.     Recently had 2 shoulder steroid injections. Seeing PT as well. Raised sugar.     With regards to the diabetes:    Diagnosed:in ~  with s/s polyuria and polydipsia.  Known complications: peripheral neuropathy     Current regimen:  Metformin 1 gm bid  prandial insulin: correction (daily ~3u with dinner only usually)  Incretin: bydureon once weekly   SGLT2: invokana 300 mg daily   Amaryl 1 mg with first full meal (after lunch)     Checks his sugar 3-4x daily:  Fastin   Lunch: 118  Dinner: 125  Bedtime: 140-150's      Hypoglycemic event? None   Knows how to correct with 15 grams of carbs- juice, coke, or a peppermint.      Diabetes Management Status  Statin: Taking  ACE/ARB: Taking  Screening or Prevention Patient's value Goal Complete/Controlled?   HgA1C Testing and Control   Lab Results   Component Value Date    HGBA1C 7.4 (H) 2020    Annually/Less than 8% Yes   Lipid profile : 2019 Annually Yes   LDL control Lab Results   Component Value Date    LDLCALC 62.0 (L) 2019    Annually/Less than 100 mg/dl  Yes   Nephropathy screening Lab Results   Component Value Date    LABMICR 4.0 2019     Lab Results   Component Value Date    PROTEINUA Negative 2008    Annually Yes   Blood pressure BP Readings from Last 1 Encounters:   20 114/84    Less than 140/90 Yes   Dilated retinal exam : 07/10/2019 Annually Yes   Foot exam   : 2019 Annually Yes     Denies numbness or tingling of feet    Typical meals: trying to keep to a healthy diet    Education - last visit:   Exercise - tries to stay active     No Polyuria polydipsia nocturia or vision changes    for his hypogonadism:    fair libido  +morning erections      using testim tubes- 1 shoulder daily- alternates shoulders.   pellets were not covered      IMAGING: 10/17/08  MRI Brain showed that anterior lobe of the pituitary gland is small measuring 4 x 4 x 4 mm.     Review of Systems   Constitutional: Negative for unexpected weight change.   Eyes: Negative for visual disturbance.   Respiratory: Negative for shortness of breath.    Cardiovascular: Negative for chest pain.   Gastrointestinal: Negative for abdominal pain.   Musculoskeletal: Negative for myalgias.   Skin: Negative for wound.   Neurological: Negative for headaches.   Hematological: Does not bruise/bleed easily.   Psychiatric/Behavioral: Negative for sleep disturbance.     Objective:   Physical Exam   Neck: No thyromegaly present.   Cardiovascular: Normal rate.   Pulmonary/Chest: Effort normal.   Abdominal: Soft.   Musculoskeletal: He exhibits no edema.   Vitals reviewed.  injection sites are ok. No lipo hypertropthy or atrophy  Appropriate footwear, Foot exam deferred, done 12/2019.   No ulcers or wounds.     Body mass index is 30.86 kg/m².    Lab Review:   Lab Results   Component Value Date    HGBA1C 7.4 (H) 01/21/2020     Lab Results   Component Value Date    CHOL 135 07/03/2019    HDL 45 07/03/2019    LDLCALC 62.0 (L) 07/03/2019    TRIG 140 07/03/2019    CHOLHDL 33.3 07/03/2019     Lab Results   Component Value Date     01/21/2020    K 4.1 01/21/2020     01/21/2020    CO2 25 01/21/2020     (H) 01/21/2020    BUN 11 01/21/2020    CREATININE 1.0 01/21/2020    CALCIUM 9.1 01/21/2020    PROT 6.5 01/21/2020    ALBUMIN 4.0 01/21/2020    BILITOT 0.2 01/21/2020    ALKPHOS 49 (L) 01/21/2020    AST 18 01/21/2020    ALT 9 (L) 01/21/2020    ANIONGAP 7 (L) 01/21/2020    ESTGFRAFRICA >60.0 01/21/2020    EGFRNONAA >60.0 01/21/2020    TSH 3.302 01/14/2014     Assessment and Plan     1. Type 2 diabetes mellitus with diabetic polyneuropathy, without long-term current use of insulin  Hemoglobin A1c     Comprehensive metabolic panel    Microalbumin/creatinine urine ratio   2. Hypogonadism in male  testosterone (TESTIM) 50 mg/5 gram (1 %) Gel   3. Type 2 diabetes mellitus without complication, with long-term current use of insulin     4. Diabetic peripheral neuropathy associated with type 2 diabetes mellitus     5. Hypertension associated with diabetes     6. Hyperlipidemia associated with type 2 diabetes mellitus  Lipid panel   7. Hypogonadism, male     8. Class 1 obesity due to excess calories with serious comorbidity and body mass index (BMI) of 31.0 to 31.9 in adult     9. Vitamin D deficiency       Type 2 diabetes mellitus with diabetic polyneuropathy  -- Reviewed goals of therapy are to get the best control we can without hypoglycemia  Medication changes:   Continue current medications at this time.  Start: Humalog 3u with dinner only unless pre-dinner sugar is <100.  -- Reviewed patient's current insulin regimen. Clarified proper insulin dose and timing in relation to meals, etc. Insulin injection sites and proper rotation instructed.    -- Advised frequent self blood glucose monitoring.  Patient encouraged to document glucose results and bring them to every clinic visit    -- Hypoglycemia precautions discussed. Instructed on precautions before driving.    -- Call for Bg repeatedly < 90 or > 180.   -- Close adherence to lifestyle changes recommended.   -- Periodic follow ups for eye evaluations, foot care and dental care suggested.    Hypogonadism, male     Reviewed  data on risks and benefits of testosterone therapy.  Studies show it may increase the risk of CAD and death and that its use should be targeting QOL.     He is doing well   Continue Testim daily    Hypertension associated with diabetes  Tolerating ACEI     Controlled   Blood pressure goals discussed with patient        Hyperlipidemia associated with type 2 diabetes mellitus  On statin per ADA recommendations  LP with next labs      Diabetic  peripheral neuropathy associated with type 2 diabetes mellitus  -- Educated patient on proper comfortable foot wear, to always wear dry socks, never walk barefoot, never cut toenails too short, never test bath water with feet, encouraged patient to inspect feet daily for wounds., and if patient applies lotion to this feet to always go around the foot and not in between toes.     Class 1 obesity due to excess calories with serious comorbidity and body mass index (BMI) of 31.0 to 31.9 in adult  -- encouraged dietary and lifestyle modifications   -- emphasized weight loss goals     Vitamin D deficiency  Last Vitamin D normal    Bone density at next visit.     Follow up in about 4 months (around 5/27/2020).  Labs prior to next appointment with me

## 2020-01-27 ENCOUNTER — OFFICE VISIT (OUTPATIENT)
Dept: ENDOCRINOLOGY | Facility: CLINIC | Age: 65
End: 2020-01-27
Payer: MEDICARE

## 2020-01-27 VITALS
WEIGHT: 240.38 LBS | BODY MASS INDEX: 30.85 KG/M2 | HEART RATE: 86 BPM | SYSTOLIC BLOOD PRESSURE: 114 MMHG | DIASTOLIC BLOOD PRESSURE: 84 MMHG | HEIGHT: 74 IN

## 2020-01-27 DIAGNOSIS — E11.59 HYPERTENSION ASSOCIATED WITH DIABETES: ICD-10-CM

## 2020-01-27 DIAGNOSIS — E66.09 CLASS 1 OBESITY DUE TO EXCESS CALORIES WITH SERIOUS COMORBIDITY AND BODY MASS INDEX (BMI) OF 31.0 TO 31.9 IN ADULT: ICD-10-CM

## 2020-01-27 DIAGNOSIS — E11.42 TYPE 2 DIABETES MELLITUS WITH DIABETIC POLYNEUROPATHY, WITHOUT LONG-TERM CURRENT USE OF INSULIN: ICD-10-CM

## 2020-01-27 DIAGNOSIS — E11.42 DIABETIC PERIPHERAL NEUROPATHY ASSOCIATED WITH TYPE 2 DIABETES MELLITUS: ICD-10-CM

## 2020-01-27 DIAGNOSIS — I15.2 HYPERTENSION ASSOCIATED WITH DIABETES: ICD-10-CM

## 2020-01-27 DIAGNOSIS — E11.69 HYPERLIPIDEMIA ASSOCIATED WITH TYPE 2 DIABETES MELLITUS: ICD-10-CM

## 2020-01-27 DIAGNOSIS — E11.42 TYPE 2 DIABETES MELLITUS WITH DIABETIC POLYNEUROPATHY, WITHOUT LONG-TERM CURRENT USE OF INSULIN: Primary | ICD-10-CM

## 2020-01-27 DIAGNOSIS — E29.1 HYPOGONADISM IN MALE: ICD-10-CM

## 2020-01-27 DIAGNOSIS — E29.1 HYPOGONADISM, MALE: ICD-10-CM

## 2020-01-27 DIAGNOSIS — Z79.4 TYPE 2 DIABETES MELLITUS WITHOUT COMPLICATION, WITH LONG-TERM CURRENT USE OF INSULIN: ICD-10-CM

## 2020-01-27 DIAGNOSIS — E11.9 TYPE 2 DIABETES MELLITUS WITHOUT COMPLICATION, WITH LONG-TERM CURRENT USE OF INSULIN: ICD-10-CM

## 2020-01-27 DIAGNOSIS — E78.5 HYPERLIPIDEMIA ASSOCIATED WITH TYPE 2 DIABETES MELLITUS: ICD-10-CM

## 2020-01-27 DIAGNOSIS — E55.9 VITAMIN D DEFICIENCY: ICD-10-CM

## 2020-01-27 PROCEDURE — 99999 PR PBB SHADOW E&M-EST. PATIENT-LVL III: ICD-10-PCS | Mod: PBBFAC,,, | Performed by: NURSE PRACTITIONER

## 2020-01-27 PROCEDURE — 99999 PR PBB SHADOW E&M-EST. PATIENT-LVL III: CPT | Mod: PBBFAC,,, | Performed by: NURSE PRACTITIONER

## 2020-01-27 PROCEDURE — 99214 PR OFFICE/OUTPT VISIT, EST, LEVL IV, 30-39 MIN: ICD-10-PCS | Mod: S$PBB,,, | Performed by: NURSE PRACTITIONER

## 2020-01-27 PROCEDURE — 99213 OFFICE O/P EST LOW 20 MIN: CPT | Mod: PBBFAC | Performed by: NURSE PRACTITIONER

## 2020-01-27 PROCEDURE — 99214 OFFICE O/P EST MOD 30 MIN: CPT | Mod: S$PBB,,, | Performed by: NURSE PRACTITIONER

## 2020-01-27 RX ORDER — TESTOSTERONE 50 MG/5G
GEL TRANSDERMAL
Qty: 150 G | Refills: 5 | Status: SHIPPED | OUTPATIENT
Start: 2020-01-27 | End: 2020-08-13 | Stop reason: SDUPTHER

## 2020-01-27 RX ORDER — GLIMEPIRIDE 1 MG/1
TABLET ORAL
Qty: 90 TABLET | Refills: 0 | OUTPATIENT
Start: 2020-01-27

## 2020-01-27 NOTE — ASSESSMENT & PLAN NOTE
Reviewed  data on risks and benefits of testosterone therapy.  Studies show it may increase the risk of CAD and death and that its use should be targeting QOL.     He is doing well   Continue Testim daily

## 2020-01-27 NOTE — ASSESSMENT & PLAN NOTE
-- Reviewed goals of therapy are to get the best control we can without hypoglycemia  Medication changes:   Continue current medications at this time.  Start: Humalog 3u with dinner only unless pre-dinner sugar is <100.  -- Reviewed patient's current insulin regimen. Clarified proper insulin dose and timing in relation to meals, etc. Insulin injection sites and proper rotation instructed.    -- Advised frequent self blood glucose monitoring.  Patient encouraged to document glucose results and bring them to every clinic visit    -- Hypoglycemia precautions discussed. Instructed on precautions before driving.    -- Call for Bg repeatedly < 90 or > 180.   -- Close adherence to lifestyle changes recommended.   -- Periodic follow ups for eye evaluations, foot care and dental care suggested.

## 2020-02-04 DIAGNOSIS — Z79.4 TYPE 2 DIABETES MELLITUS WITHOUT COMPLICATION, WITH LONG-TERM CURRENT USE OF INSULIN: ICD-10-CM

## 2020-02-04 DIAGNOSIS — E11.9 TYPE 2 DIABETES MELLITUS WITHOUT COMPLICATION, WITH LONG-TERM CURRENT USE OF INSULIN: ICD-10-CM

## 2020-02-04 RX ORDER — METFORMIN HYDROCHLORIDE 1000 MG/1
TABLET ORAL
Qty: 180 TABLET | Refills: 3 | Status: SHIPPED | OUTPATIENT
Start: 2020-02-04 | End: 2020-05-29 | Stop reason: SDUPTHER

## 2020-02-22 DIAGNOSIS — E11.42 TYPE 2 DIABETES MELLITUS WITH DIABETIC POLYNEUROPATHY, WITHOUT LONG-TERM CURRENT USE OF INSULIN: ICD-10-CM

## 2020-02-26 RX ORDER — CANAGLIFLOZIN 300 MG/1
TABLET, FILM COATED ORAL
Qty: 90 TABLET | Refills: 3 | Status: SHIPPED | OUTPATIENT
Start: 2020-02-26 | End: 2020-05-29 | Stop reason: SDUPTHER

## 2020-03-09 DIAGNOSIS — E11.42 TYPE 2 DIABETES MELLITUS WITH DIABETIC POLYNEUROPATHY, WITHOUT LONG-TERM CURRENT USE OF INSULIN: ICD-10-CM

## 2020-03-10 RX ORDER — PEN NEEDLE, DIABETIC 30 GX3/16"
NEEDLE, DISPOSABLE MISCELLANEOUS
Qty: 100 EACH | Refills: 6 | Status: SHIPPED | OUTPATIENT
Start: 2020-03-10 | End: 2020-05-29 | Stop reason: SDUPTHER

## 2020-03-20 ENCOUNTER — PATIENT MESSAGE (OUTPATIENT)
Dept: ADMINISTRATIVE | Facility: OTHER | Age: 65
End: 2020-03-20

## 2020-03-24 RX ORDER — BLOOD-GLUCOSE METER
EACH MISCELLANEOUS
Qty: 300 STRIP | Refills: 0 | Status: SHIPPED | OUTPATIENT
Start: 2020-03-24 | End: 2020-05-29 | Stop reason: SDUPTHER

## 2020-03-26 DIAGNOSIS — E78.5 HYPERLIPIDEMIA ASSOCIATED WITH TYPE 2 DIABETES MELLITUS: ICD-10-CM

## 2020-03-26 DIAGNOSIS — E11.69 HYPERLIPIDEMIA ASSOCIATED WITH TYPE 2 DIABETES MELLITUS: ICD-10-CM

## 2020-03-26 RX ORDER — EZETIMIBE AND SIMVASTATIN 10; 40 MG/1; MG/1
TABLET ORAL
Qty: 90 TABLET | Refills: 0 | Status: SHIPPED | OUTPATIENT
Start: 2020-03-26 | End: 2020-05-29 | Stop reason: SDUPTHER

## 2020-03-30 DIAGNOSIS — E11.42 TYPE 2 DIABETES MELLITUS WITH DIABETIC POLYNEUROPATHY, WITHOUT LONG-TERM CURRENT USE OF INSULIN: ICD-10-CM

## 2020-03-30 RX ORDER — INSULIN LISPRO 100 [IU]/ML
INJECTION, SOLUTION INTRAVENOUS; SUBCUTANEOUS
Qty: 15 ML | Refills: 3 | Status: SHIPPED | OUTPATIENT
Start: 2020-03-30

## 2020-04-26 DIAGNOSIS — E11.42 TYPE 2 DIABETES MELLITUS WITH DIABETIC POLYNEUROPATHY, WITHOUT LONG-TERM CURRENT USE OF INSULIN: ICD-10-CM

## 2020-04-27 RX ORDER — GLIMEPIRIDE 1 MG/1
TABLET ORAL
Qty: 90 TABLET | Refills: 3 | Status: SHIPPED | OUTPATIENT
Start: 2020-04-27 | End: 2020-05-29

## 2020-05-01 ENCOUNTER — PATIENT MESSAGE (OUTPATIENT)
Dept: ENDOCRINOLOGY | Facility: CLINIC | Age: 65
End: 2020-05-01

## 2020-05-12 ENCOUNTER — PATIENT MESSAGE (OUTPATIENT)
Dept: ENDOCRINOLOGY | Facility: CLINIC | Age: 65
End: 2020-05-12

## 2020-05-18 ENCOUNTER — LAB VISIT (OUTPATIENT)
Dept: LAB | Facility: HOSPITAL | Age: 65
End: 2020-05-18
Payer: MEDICARE

## 2020-05-18 DIAGNOSIS — E78.5 HYPERLIPIDEMIA ASSOCIATED WITH TYPE 2 DIABETES MELLITUS: ICD-10-CM

## 2020-05-18 DIAGNOSIS — E11.59 HYPERTENSION ASSOCIATED WITH DIABETES: ICD-10-CM

## 2020-05-18 DIAGNOSIS — E11.42 TYPE 2 DIABETES MELLITUS WITH DIABETIC POLYNEUROPATHY, WITHOUT LONG-TERM CURRENT USE OF INSULIN: ICD-10-CM

## 2020-05-18 DIAGNOSIS — I15.2 HYPERTENSION ASSOCIATED WITH DIABETES: ICD-10-CM

## 2020-05-18 DIAGNOSIS — E11.69 HYPERLIPIDEMIA ASSOCIATED WITH TYPE 2 DIABETES MELLITUS: ICD-10-CM

## 2020-05-18 LAB
ALBUMIN SERPL BCP-MCNC: 4.1 G/DL (ref 3.5–5.2)
ALP SERPL-CCNC: 52 U/L (ref 55–135)
ALT SERPL W/O P-5'-P-CCNC: 9 U/L (ref 10–44)
ANION GAP SERPL CALC-SCNC: 8 MMOL/L (ref 8–16)
ANION GAP SERPL CALC-SCNC: 8 MMOL/L (ref 8–16)
AST SERPL-CCNC: 16 U/L (ref 10–40)
BILIRUB SERPL-MCNC: 0.3 MG/DL (ref 0.1–1)
BUN SERPL-MCNC: 16 MG/DL (ref 8–23)
BUN SERPL-MCNC: 16 MG/DL (ref 8–23)
CALCIUM SERPL-MCNC: 9.1 MG/DL (ref 8.7–10.5)
CALCIUM SERPL-MCNC: 9.1 MG/DL (ref 8.7–10.5)
CHLORIDE SERPL-SCNC: 106 MMOL/L (ref 95–110)
CHLORIDE SERPL-SCNC: 106 MMOL/L (ref 95–110)
CHOLEST SERPL-MCNC: 130 MG/DL (ref 120–199)
CHOLEST/HDLC SERPL: 2.8 {RATIO} (ref 2–5)
CO2 SERPL-SCNC: 24 MMOL/L (ref 23–29)
CO2 SERPL-SCNC: 24 MMOL/L (ref 23–29)
CREAT SERPL-MCNC: 0.9 MG/DL (ref 0.5–1.4)
CREAT SERPL-MCNC: 0.9 MG/DL (ref 0.5–1.4)
EST. GFR  (AFRICAN AMERICAN): >60 ML/MIN/1.73 M^2
EST. GFR  (AFRICAN AMERICAN): >60 ML/MIN/1.73 M^2
EST. GFR  (NON AFRICAN AMERICAN): >60 ML/MIN/1.73 M^2
EST. GFR  (NON AFRICAN AMERICAN): >60 ML/MIN/1.73 M^2
ESTIMATED AVG GLUCOSE: 160 MG/DL (ref 68–131)
GLUCOSE SERPL-MCNC: 116 MG/DL (ref 70–110)
GLUCOSE SERPL-MCNC: 116 MG/DL (ref 70–110)
HBA1C MFR BLD HPLC: 7.2 % (ref 4–5.6)
HDLC SERPL-MCNC: 47 MG/DL (ref 40–75)
HDLC SERPL: 36.2 % (ref 20–50)
LDLC SERPL CALC-MCNC: 64.4 MG/DL (ref 63–159)
NONHDLC SERPL-MCNC: 83 MG/DL
POTASSIUM SERPL-SCNC: 4.1 MMOL/L (ref 3.5–5.1)
POTASSIUM SERPL-SCNC: 4.1 MMOL/L (ref 3.5–5.1)
PROT SERPL-MCNC: 6.9 G/DL (ref 6–8.4)
SODIUM SERPL-SCNC: 138 MMOL/L (ref 136–145)
SODIUM SERPL-SCNC: 138 MMOL/L (ref 136–145)
TRIGL SERPL-MCNC: 93 MG/DL (ref 30–150)

## 2020-05-18 PROCEDURE — 80061 LIPID PANEL: CPT

## 2020-05-18 PROCEDURE — 83036 HEMOGLOBIN GLYCOSYLATED A1C: CPT

## 2020-05-18 PROCEDURE — 36415 COLL VENOUS BLD VENIPUNCTURE: CPT | Mod: PN

## 2020-05-18 PROCEDURE — 80053 COMPREHEN METABOLIC PANEL: CPT

## 2020-05-26 NOTE — PROGRESS NOTES
Subjective:      Patient ID: Zhao Dodd Jr. is a 65 y.o. male.    Chief Complaint:  Diabetes    History of Present Illness  Zhao Dodd Jr. presents today for follow up of Type 2 DM & hypogonadism.     Recently had 2 shoulder steroid injections. Seeing PT as well. Raised sugar.     With regards to the diabetes:    Diagnosed:in ~  with s/s polyuria and polydipsia.  Known complications: peripheral neuropathy     Current regimen:  Metformin 1 gm bid  prandial insulin: correction (daily ~3u with dinner only usually)  bydureon once weekly   invokana 300 mg daily   Amaryl 1 mg with first full meal (after lunch)     Checks his sugar 3-4x daily:  Fastin 142    Lunch: 115  Dinner: 130-140  Bedtime: 120-130     Hypoglycemic event? None   Knows how to correct with 15 grams of carbs- juice, coke, or glucose tablets.      Diabetes Management Status  Statin: Taking  ACE/ARB: Taking  Screening or Prevention Patient's value Goal Complete/Controlled?   HgA1C Testing and Control   Lab Results   Component Value Date    HGBA1C 7.2 (H) 2020    Annually/Less than 8% Yes   Lipid profile : 2020 Annually Yes   LDL control Lab Results   Component Value Date    LDLCALC 64.4 2020    Annually/Less than 100 mg/dl  Yes   Nephropathy screening Lab Results   Component Value Date    LABMICR 5.0 2020     Lab Results   Component Value Date    PROTEINUA Negative 2008    Annually Yes   Blood pressure BP Readings from Last 1 Encounters:   20 128/74    Less than 140/90 Yes   Dilated retinal exam : 07/10/2019 Annually Yes   Foot exam   : 2019 Annually Yes     Denies numbness or tingling of feet    Typical meals: trying to keep to a healthy diet    Education - last visit:   Exercise - tries to stay active     No Polyuria polydipsia nocturia or vision changes    for his hypogonadism:    fair libido  +morning erections     using testim tubes- 1 shoulder daily- alternates shoulders.   pellets were not  covered      IMAGING: 10/17/08  MRI Brain showed that anterior lobe of the pituitary gland is small measuring 4 x 4 x 4 mm.     Review of Systems   Constitutional: Negative for unexpected weight change.   Eyes: Negative for visual disturbance.   Respiratory: Negative for shortness of breath.    Cardiovascular: Negative for chest pain.   Gastrointestinal: Negative for abdominal pain.   Musculoskeletal: Negative for myalgias.   Skin: Negative for wound.   Neurological: Negative for headaches.   Hematological: Does not bruise/bleed easily.   Psychiatric/Behavioral: Negative for sleep disturbance.     Objective:   Physical Exam   Neck: No thyromegaly present.   Cardiovascular: Normal rate.   Pulmonary/Chest: Effort normal.   Abdominal: Soft.   Musculoskeletal: He exhibits no edema.   Vitals reviewed.  injection sites are ok. No lipo hypertropthy or atrophy  Appropriate footwear, Foot exam deferred, done 12/2019.   No ulcers or wounds.     Body mass index is 30.5 kg/m².    Lab Review:   Lab Results   Component Value Date    HGBA1C 7.2 (H) 05/18/2020     Lab Results   Component Value Date    CHOL 130 05/18/2020    HDL 47 05/18/2020    LDLCALC 64.4 05/18/2020    TRIG 93 05/18/2020    CHOLHDL 36.2 05/18/2020     Lab Results   Component Value Date     05/18/2020     05/18/2020    K 4.1 05/18/2020    K 4.1 05/18/2020     05/18/2020     05/18/2020    CO2 24 05/18/2020    CO2 24 05/18/2020     (H) 05/18/2020     (H) 05/18/2020    BUN 16 05/18/2020    BUN 16 05/18/2020    CREATININE 0.9 05/18/2020    CREATININE 0.9 05/18/2020    CALCIUM 9.1 05/18/2020    CALCIUM 9.1 05/18/2020    PROT 6.9 05/18/2020    ALBUMIN 4.1 05/18/2020    BILITOT 0.3 05/18/2020    ALKPHOS 52 (L) 05/18/2020    AST 16 05/18/2020    ALT 9 (L) 05/18/2020    ANIONGAP 8 05/18/2020    ANIONGAP 8 05/18/2020    ESTGFRAFRICA >60.0 05/18/2020    ESTGFRAFRICA >60.0 05/18/2020    EGFRNONAA >60.0 05/18/2020    EGFRNONAA >60.0  "05/18/2020    TSH 3.302 01/14/2014     Assessment and Plan     1. Type 2 diabetes mellitus with diabetic polyneuropathy, without long-term current use of insulin  glimepiride (AMARYL) 2 MG tablet    Hemoglobin A1C    Comprehensive metabolic panel    blood sugar diagnostic (ONETOUCH VERIO) Strp    metFORMIN (GLUCOPHAGE) 1000 MG tablet    canagliflozin (INVOKANA) 300 mg Tab tablet    pen needle, diabetic (BD ULTRA-FINE MINI PEN NEEDLE) 31 gauge x 3/16" Ndle   2. Hypogonadism, male     3. Hypertension associated with diabetes     4. Hyperlipidemia associated with type 2 diabetes mellitus  ezetimibe-simvastatin 10-40 mg (VYTORIN) 10-40 mg per tablet   5. Diabetic peripheral neuropathy associated with type 2 diabetes mellitus     6. Class 1 obesity due to excess calories with serious comorbidity and body mass index (BMI) of 31.0 to 31.9 in adult     7. Vitamin D deficiency     8. Type 2 diabetes mellitus without complication, with long-term current use of insulin       Type 2 diabetes mellitus with diabetic polyneuropathy  -- A1c goal 7%   -- Reviewed goals of therapy are to get the best control we can without hypoglycemia  Medication changes:   Continue humalog metformin bydureon & humalog with dinner  Increase amaryl 2 mg daily. Discussed risk of hypoglycemia  -- Reviewed patient's current insulin regimen. Clarified proper insulin dose and timing in relation to meals, etc. Insulin injection sites and proper rotation instructed.    -- Advised frequent self blood glucose monitoring.  Patient encouraged to document glucose results and bring them to every clinic visit    -- Hypoglycemia precautions discussed. Instructed on precautions before driving.    -- Call for Bg repeatedly < 90 or > 180.   -- Close adherence to lifestyle changes recommended.   -- Periodic follow ups for eye evaluations, foot care and dental care suggested.    Hypogonadism, male     Reviewed  data on risks and benefits of testosterone therapy.  " Studies show it may increase the risk of CAD and death and that its use should be targeting QOL.     He is doing well   Continue Testim daily    Hypertension associated with diabetes  Tolerating ACEI     Controlled   Blood pressure goals discussed with patient        Hyperlipidemia associated with type 2 diabetes mellitus  On statin per ADA recommendations  LP with next labs      Diabetic peripheral neuropathy associated with type 2 diabetes mellitus  -- Educated patient on proper comfortable foot wear, to always wear dry socks, never walk barefoot, never cut toenails too short, never test bath water with feet, encouraged patient to inspect feet daily for wounds., and if patient applies lotion to this feet to always go around the foot and not in between toes.     Class 1 obesity due to excess calories with serious comorbidity and body mass index (BMI) of 31.0 to 31.9 in adult  -- encouraged dietary and lifestyle modifications   -- emphasized weight loss goals     Vitamin D deficiency  Last Vitamin D normal    Bone density at next visit., patient wants to wait.     Follow up in about 3 months (around 8/29/2020).  Labs prior to next appointment with me

## 2020-05-28 ENCOUNTER — TELEPHONE (OUTPATIENT)
Dept: ENDOCRINOLOGY | Facility: CLINIC | Age: 65
End: 2020-05-28

## 2020-05-29 ENCOUNTER — OFFICE VISIT (OUTPATIENT)
Dept: ENDOCRINOLOGY | Facility: CLINIC | Age: 65
End: 2020-05-29
Payer: MEDICARE

## 2020-05-29 VITALS
HEIGHT: 74 IN | BODY MASS INDEX: 30.49 KG/M2 | SYSTOLIC BLOOD PRESSURE: 128 MMHG | DIASTOLIC BLOOD PRESSURE: 74 MMHG | WEIGHT: 237.56 LBS | HEART RATE: 72 BPM

## 2020-05-29 DIAGNOSIS — E11.42 TYPE 2 DIABETES MELLITUS WITH DIABETIC POLYNEUROPATHY, WITHOUT LONG-TERM CURRENT USE OF INSULIN: Primary | ICD-10-CM

## 2020-05-29 DIAGNOSIS — E11.42 DIABETIC PERIPHERAL NEUROPATHY ASSOCIATED WITH TYPE 2 DIABETES MELLITUS: ICD-10-CM

## 2020-05-29 DIAGNOSIS — E55.9 VITAMIN D DEFICIENCY: ICD-10-CM

## 2020-05-29 DIAGNOSIS — Z79.4 TYPE 2 DIABETES MELLITUS WITHOUT COMPLICATION, WITH LONG-TERM CURRENT USE OF INSULIN: ICD-10-CM

## 2020-05-29 DIAGNOSIS — E66.09 CLASS 1 OBESITY DUE TO EXCESS CALORIES WITH SERIOUS COMORBIDITY AND BODY MASS INDEX (BMI) OF 31.0 TO 31.9 IN ADULT: ICD-10-CM

## 2020-05-29 DIAGNOSIS — I15.2 HYPERTENSION ASSOCIATED WITH DIABETES: ICD-10-CM

## 2020-05-29 DIAGNOSIS — E78.5 HYPERLIPIDEMIA ASSOCIATED WITH TYPE 2 DIABETES MELLITUS: ICD-10-CM

## 2020-05-29 DIAGNOSIS — E11.69 HYPERLIPIDEMIA ASSOCIATED WITH TYPE 2 DIABETES MELLITUS: ICD-10-CM

## 2020-05-29 DIAGNOSIS — E11.59 HYPERTENSION ASSOCIATED WITH DIABETES: ICD-10-CM

## 2020-05-29 DIAGNOSIS — E29.1 HYPOGONADISM, MALE: ICD-10-CM

## 2020-05-29 DIAGNOSIS — E11.9 TYPE 2 DIABETES MELLITUS WITHOUT COMPLICATION, WITH LONG-TERM CURRENT USE OF INSULIN: ICD-10-CM

## 2020-05-29 PROCEDURE — 99213 OFFICE O/P EST LOW 20 MIN: CPT | Mod: PBBFAC | Performed by: NURSE PRACTITIONER

## 2020-05-29 PROCEDURE — 99999 PR PBB SHADOW E&M-EST. PATIENT-LVL III: ICD-10-PCS | Mod: PBBFAC,,, | Performed by: NURSE PRACTITIONER

## 2020-05-29 PROCEDURE — 99999 PR PBB SHADOW E&M-EST. PATIENT-LVL III: CPT | Mod: PBBFAC,,, | Performed by: NURSE PRACTITIONER

## 2020-05-29 PROCEDURE — 99214 OFFICE O/P EST MOD 30 MIN: CPT | Mod: S$PBB,,, | Performed by: NURSE PRACTITIONER

## 2020-05-29 PROCEDURE — 99214 PR OFFICE/OUTPT VISIT, EST, LEVL IV, 30-39 MIN: ICD-10-PCS | Mod: S$PBB,,, | Performed by: NURSE PRACTITIONER

## 2020-05-29 RX ORDER — PEN NEEDLE, DIABETIC 30 GX3/16"
NEEDLE, DISPOSABLE MISCELLANEOUS
Qty: 100 EACH | Refills: 6 | Status: SHIPPED | OUTPATIENT
Start: 2020-05-29

## 2020-05-29 RX ORDER — METFORMIN HYDROCHLORIDE 1000 MG/1
1000 TABLET ORAL 2 TIMES DAILY WITH MEALS
Qty: 180 TABLET | Refills: 3 | Status: SHIPPED | OUTPATIENT
Start: 2020-05-29 | End: 2020-08-06

## 2020-05-29 RX ORDER — EZETIMIBE AND SIMVASTATIN 10; 40 MG/1; MG/1
1 TABLET ORAL NIGHTLY
Qty: 90 TABLET | Refills: 4 | Status: SHIPPED | OUTPATIENT
Start: 2020-05-29

## 2020-05-29 RX ORDER — GLIMEPIRIDE 2 MG/1
2 TABLET ORAL
Qty: 30 TABLET | Refills: 11 | Status: SHIPPED | OUTPATIENT
Start: 2020-05-29 | End: 2021-05-29

## 2020-05-29 NOTE — ASSESSMENT & PLAN NOTE
-- A1c goal 7%   -- Reviewed goals of therapy are to get the best control we can without hypoglycemia  Medication changes:   Continue humalog metformin bydureon & humalog with dinner  Increase amaryl 2 mg daily. Discussed risk of hypoglycemia  -- Reviewed patient's current insulin regimen. Clarified proper insulin dose and timing in relation to meals, etc. Insulin injection sites and proper rotation instructed.    -- Advised frequent self blood glucose monitoring.  Patient encouraged to document glucose results and bring them to every clinic visit    -- Hypoglycemia precautions discussed. Instructed on precautions before driving.    -- Call for Bg repeatedly < 90 or > 180.   -- Close adherence to lifestyle changes recommended.   -- Periodic follow ups for eye evaluations, foot care and dental care suggested.

## 2020-06-20 NOTE — PROGRESS NOTES
Subjective:     Zhao Dodd Jr. is a 65 y.o. male who presents to the office today for a preoperative consultation at the request of surgeon Dr. Viraj Salinas who plans on performing right shoulder arthroscopy on July 9. This consultation is requested for the specific conditions prompting preoperative evaluation (i.e. because of potential affect on operative risk): medical clearance due to multiple chronic conditions. Planned anesthesia: general. The patient has the following known anesthesia issues: none. Patients bleeding risk: no recent abnormal bleeding, no remote history of abnormal bleeding and no use of Ca-channel blockers. Patient does not have objections to receiving blood products if needed.    Patient Active Problem List   Diagnosis    Diabetic peripheral neuropathy associated with type 2 diabetes mellitus    Hypertension associated with diabetes    Hyperlipidemia associated with type 2 diabetes mellitus    Hypogonadism, male    AR (allergic rhinitis)    Vitamin D deficiency    Class 1 obesity due to excess calories with serious comorbidity and body mass index (BMI) of 31.0 to 31.9 in adult    Type 2 diabetes mellitus with diabetic polyneuropathy    Personal history of colonic polyps    Long-term current use of testosterone replacement therapy       Past Surgical History:   Procedure Laterality Date    CATARACT EXTRACTION      COLONOSCOPY      COLONOSCOPY N/A 3/13/2019    Procedure: COLONOSCOPY/Clenpiq;  Surgeon: Alona Shaw MD;  Location: [facility];  Service: Endoscopy;  Laterality: N/A;  Earliest appointment/Diabetic    ELBOW SURGERY      ESOPHAGOGASTRODUODENOSCOPY      HERNIA REPAIR      umbicial and inguinal    KNEE SURGERY      Spinal injections           Current Outpatient Medications:     aspirin 81 mg Tab, Take 1 tablet by mouth., Disp: , Rfl:     blood sugar diagnostic (ONETOUCH VERIO) Strp, TEST BLOOD SUGAR THREE TIMES DAILY AS DIRECTED, Disp: 300 strip, Rfl:  "0    canagliflozin (INVOKANA) 300 mg Tab tablet, Take 1 tablet (300 mg total) by mouth once daily., Disp: 90 tablet, Rfl: 3    CLENPIQ 10 mg-3.5 gram -12 gram/160 mL Soln, TK UTD, Disp: , Rfl: 0    diclofenac sodium (SOLARAZE) 3 % gel, APPLY 2.0 GRAMS(2 PUMPS)TO THE AFFECTED AREAS UP TO 5 TIMES DAILY (10 GRAMS PER DAY), Disp: , Rfl: 5    ergocalciferol (ERGOCALCIFEROL) 50,000 unit Cap, TAKE ONE CAPSULE BY MOUTH EVERY 7 DAYS, Disp: 12 capsule, Rfl: 3    exenatide microspheres (BYDUREON) 2 mg/0.65 mL PnIj, Inject 2 mg into the skin every 7 days., Disp: 12 each, Rfl: 3    ezetimibe-simvastatin 10-40 mg (VYTORIN) 10-40 mg per tablet, Take 1 tablet by mouth nightly., Disp: 90 tablet, Rfl: 4    GEL-SELWYN 0.4 % Gel, As directed, Disp: , Rfl: 0    glimepiride (AMARYL) 2 MG tablet, Take 1 tablet (2 mg total) by mouth before breakfast., Disp: 30 tablet, Rfl: 11    HYDROcodone-acetaminophen (NORCO) 7.5-325 mg per tablet, TK 1 T PO  Q 6 TO 8 H PRN, Disp: , Rfl: 0    insulin lispro (HUMALOG KWIKPEN INSULIN) 100 unit/mL pen, INJECT PER SLIDING SCALE. USE UP TO 15 UNITS WITH EACH MEAL, Disp: 15 mL, Rfl: 3    ketoconazole (NIZORAL) 2 % cream, APPLY TO AA BID, Disp: , Rfl: 0    lisinopril 10 MG tablet, TAKE ONE TABLET BY MOUTH EVERY DAY, Disp: 90 tablet, Rfl: 3    LYRICA 150 mg capsule, Take 1 capsule by mouth 2 (two) times daily., Disp: , Rfl: 5    metFORMIN (GLUCOPHAGE) 1000 MG tablet, Take 1 tablet (1,000 mg total) by mouth 2 (two) times daily with meals., Disp: 180 tablet, Rfl: 3    naproxen sodium 375 mg TM24, TAKE 1 TO 3 TABLETS BY MOUTH ONCE DAILY, Disp: , Rfl: 1    omega-3 fatty acids-vitamin E (FISH OIL) 1,000 mg Cap, Take 1,000 mg by mouth Twice daily., Disp: , Rfl:     omeprazole (PRILOSEC) 40 MG capsule, Take 40 mg by mouth daily as needed., Disp: , Rfl: 4    oxiconazole (OXISTAT) 1 % Crea, APPLY AA BID, Disp: , Rfl: 2    pen needle, diabetic (BD ULTRA-FINE MINI PEN NEEDLE) 31 gauge x 3/16" Ndle, USE AS " DIRECTED WITH HUMALOG PEN, Disp: 100 each, Rfl: 6    tadalafil (CIALIS) 10 MG tablet, TAKE 1 TABLET BY MOUTH AS NEEDED, Disp: 10 tablet, Rfl: 5    testosterone (TESTIM) 50 mg/5 gram (1 %) Gel, APPLY 5 GRAMS TOPICALLY ONCE DAILY, Disp: 150 g, Rfl: 5    zonisamide (ZONEGRAN) 50 MG Cap, TK ONE C PO TID, Disp: , Rfl: 2    Review of patient's allergies indicates:   Allergen Reactions    Penicillins Other (See Comments)     Other reaction(s): Unknown  Patient states PCN had no effect on his illness       Family History   Problem Relation Age of Onset    Diabetes Father     Heart attack Father     Diabetes Mother     Cancer Mother         pancreas/kidney/duodenum/colon    Diabetes Paternal Grandmother        Social History     Socioeconomic History    Marital status:      Spouse name: Not on file    Number of children: Not on file    Years of education: Not on file    Highest education level: Not on file   Occupational History    Occupation:      Employer: [employer]MENT     Comment: retired   Social Needs    Financial resource strain: Not hard at all    Food insecurity     Worry: Never true     Inability: Never true    Transportation needs     Medical: No     Non-medical: No   Tobacco Use    Smoking status: Never Smoker    Smokeless tobacco: Never Used   Substance and Sexual Activity    Alcohol use: Yes     Frequency: 2-4 times a month     Drinks per session: 1 or 2     Binge frequency: Never     Comment: maybe 2 drinks a month    Drug use: No    Sexual activity: Yes     Partners: Female   Lifestyle    Physical activity     Days per week: 4 days     Minutes per session: 60 min    Stress: Not at all   Relationships    Social connections     Talks on phone: Patient refused     Gets together: Patient refused     Attends [employer] service: More than 4 times per year     Active member of club or organization: Patient refused     Attends meetings of clubs or organizations:  "Patient refused     Relationship status:    Other Topics Concern    Not on file   Social History Narrative    The patient does exercise regularly (walks 5 days/week).      He is not satisfied with weight.    Rates diet as good.    He does drink at least 1/2 gallon water daily.    He drinks 4 coffee/tea/caffeine-containing soft drinks daily.    Total sleep time at night is 7-8 hours.    He does wear seat belts.    Hobbies include none.       Patient Care Team:  Gopal Chavez Jr., MD as PCP - General (Family Medicine)  Gopal Chavez Jr., MD as PCP - MSSP Attributed  Tyler Fishman MD (Pain Medicine)  Aura Perez MD as Consulting Physician (Endocrinology)  Sukhdeep Nair MD as Consulting Physician (Dermatology)  Shlomo Chakraborty MD as Consulting Physician (Ophthalmology)    Review of Systems  A comprehensive review of systems was negative except for: Musculoskeletal: positive for arthralgias      Objective:      /60   Pulse 98   Ht 6' 2" (1.88 m)   Wt 107.5 kg (237 lb)   SpO2 95%   BMI 30.43 kg/m²     General Appearance:    Alert, cooperative, no distress, appears stated age   Head:    Normocephalic, without obvious abnormality, atraumatic   Eyes:    PERRL, conjunctiva/corneas clear, EOM's intact, fundi     benign, both eyes        Ears:    Normal TM's and external ear canals, both ears   Nose:   Nares normal, septum midline, mucosa normal, no drainage    or sinus tenderness   Throat:   Lips, mucosa, and tongue normal; teeth and gums normal   Neck:   Supple, symmetrical, trachea midline, no adenopathy;        thyroid:  No enlargement/tenderness/nodules; no carotid    bruit or JVD   Back:     Symmetric, no curvature, ROM normal, no CVA tenderness   Lungs:     Clear to auscultation bilaterally, respirations unlabored   Chest wall:    No tenderness or deformity   Heart:    Regular rate and rhythm, S1 and S2 normal, no murmur, rub   or gallop   Abdomen:     Soft, non-tender, bowel sounds " active all four quadrants,     no masses, no organomegaly   Extremities:   Extremities normal, atraumatic, no cyanosis or edema   Pulses:   2+ and symmetric all extremities   Skin:   Skin color, texture, turgor normal, no rashes or lesions   Lymph nodes:   Cervical, supraclavicular, and axillary nodes normal   Neurologic:   CNII-XII intact. Normal strength, sensation and reflexes       throughout       Predictors of intubation difficulty:   Morbid obesity? no   Anatomically abnormal facies? no   Prominent incisors? no   Receding mandible? no   Short, thick neck? no   Neck range of motion: normal   Mallampati score: I (soft palate, uvula, fauces, and tonsillar pillars visible)   Thyromental distance: 8 cm   Mouth openin cm   Dentition: No chipped, loose, or missing teeth.    Cardiographics  ECG 2020: normal sinus rhythm, no blocks or conduction defects, no ischemic changes    Imaging  Chest x-ray: No acute chest disease identified.  No detrimental change noted when compared to 2007.    Lab Review   Component Date Value Ref Range    Microalbum.,U,Random 2020 5.0  ug/mL    Creatinine, Random Ur 2020 78.0  23.0 - 375.0 mg/dL    Microalb Creat Ratio 2020 6.4  0.0 - 30.0 ug/mg   Component Date Value Ref Range    Sodium 2020 138  136 - 145 mmol/L    Potassium 2020 4.1  3.5 - 5.1 mmol/L    Chloride 2020 106  95 - 110 mmol/L    CO2 2020 24  23 - 29 mmol/L    Glucose 2020 116* 70 - 110 mg/dL    BUN, Bld 2020 16  8 - 23 mg/dL    Creatinine 2020 0.9  0.5 - 1.4 mg/dL    Calcium 2020 9.1  8.7 - 10.5 mg/dL    Anion Gap 2020 8  8 - 16 mmol/L    eGFR if African American 2020 >60.0  >60 mL/min/1.73 m^2    eGFR if non African American 2020 >60.0  >60 mL/min/1.73 m^2    Hemoglobin A1C 2020 7.2* 4.0 - 5.6 %    Estimated Avg Glucose 2020 160* 68 - 131 mg/dL    Sodium 2020 138  136 - 145 mmol/L    Potassium  05/18/2020 4.1  3.5 - 5.1 mmol/L    Chloride 05/18/2020 106  95 - 110 mmol/L    CO2 05/18/2020 24  23 - 29 mmol/L    Glucose 05/18/2020 116* 70 - 110 mg/dL    BUN, Bld 05/18/2020 16  8 - 23 mg/dL    Creatinine 05/18/2020 0.9  0.5 - 1.4 mg/dL    Calcium 05/18/2020 9.1  8.7 - 10.5 mg/dL    Total Protein 05/18/2020 6.9  6.0 - 8.4 g/dL    Albumin 05/18/2020 4.1  3.5 - 5.2 g/dL    Total Bilirubin 05/18/2020 0.3  0.1 - 1.0 mg/dL    Alkaline Phosphatase 05/18/2020 52* 55 - 135 U/L    AST 05/18/2020 16  10 - 40 U/L    ALT 05/18/2020 9* 10 - 44 U/L    Anion Gap 05/18/2020 8  8 - 16 mmol/L    eGFR if African American 05/18/2020 >60.0  >60 mL/min/1.73 m^2    eGFR if non African American 05/18/2020 >60.0  >60 mL/min/1.73 m^2    Cholesterol 05/18/2020 130  120 - 199 mg/dL    Triglycerides 05/18/2020 93  30 - 150 mg/dL    HDL 05/18/2020 47  40 - 75 mg/dL    LDL Cholesterol 05/18/2020 64.4  63.0 - 159.0 mg/dL    Hdl/Cholesterol Ratio 05/18/2020 36.2  20.0 - 50.0 %    Total Cholesterol/HDL Ratio 05/18/2020 2.8  2.0 - 5.0    Non-HDL Cholesterol 05/18/2020 83  mg/dL          Assessment:       1. Preop examination  X-Ray Chest PA And Lateral    EKG 12-lead   2. Primary osteoarthritis of right shoulder     3. Type 2 diabetes mellitus with diabetic polyneuropathy, without long-term current use of insulin     4. Hypertension associated with diabetes     5. Hyperlipidemia associated with type 2 diabetes mellitus     6. Diabetic peripheral neuropathy associated with type 2 diabetes mellitus     7. Class 1 obesity due to excess calories with serious comorbidity and body mass index (BMI) of 31.0 to 31.9 in adult         65 y.o. male with planned surgery as above.    Known risk factors for perioperative complications: Diabetes mellitus    Difficulty with intubation is not anticipated.    Cardiac Risk Estimation: 0.16%    Current medications which may produce withdrawal symptoms if withheld perioperatively: none       Plan:      1. Preoperative workup as follows none.  2. Change in medication regimen before surgery: discontinue ASA 14 days before surgery and discontinue NSAIDs 14 days before surgery.  3. Prophylaxis for cardiac events with perioperative beta-blockers: not indicated.  4. Invasive hemodynamic monitoring perioperatively: at the discretion of anesthesiologist.  5. Deep vein thrombosis prophylaxis postoperatively:regimen to be chosen by surgical team.  6. Surveillance for postoperative MI with ECG immediately postoperatively and on postoperative days 1 and 2 AND troponin levels 24 hours postoperatively and on day 4 or hospital discharge (whichever comes first): at the discretion of anesthesiologist.  7. Other measures: close perioperative and intraoperative glucose monitoring.       The patient is cleared for surgery.        Gopal Chavez Jr., MD

## 2020-06-24 ENCOUNTER — OFFICE VISIT (OUTPATIENT)
Dept: FAMILY MEDICINE | Facility: CLINIC | Age: 65
End: 2020-06-24
Attending: FAMILY MEDICINE
Payer: MEDICARE

## 2020-06-24 VITALS
HEART RATE: 98 BPM | HEIGHT: 74 IN | SYSTOLIC BLOOD PRESSURE: 110 MMHG | WEIGHT: 237 LBS | DIASTOLIC BLOOD PRESSURE: 60 MMHG | OXYGEN SATURATION: 95 % | BODY MASS INDEX: 30.42 KG/M2

## 2020-06-24 DIAGNOSIS — M19.011 PRIMARY OSTEOARTHRITIS OF RIGHT SHOULDER: ICD-10-CM

## 2020-06-24 DIAGNOSIS — Z01.818 PREOP EXAMINATION: Primary | ICD-10-CM

## 2020-06-24 DIAGNOSIS — E11.69 HYPERLIPIDEMIA ASSOCIATED WITH TYPE 2 DIABETES MELLITUS: ICD-10-CM

## 2020-06-24 DIAGNOSIS — E11.42 DIABETIC PERIPHERAL NEUROPATHY ASSOCIATED WITH TYPE 2 DIABETES MELLITUS: ICD-10-CM

## 2020-06-24 DIAGNOSIS — E66.09 CLASS 1 OBESITY DUE TO EXCESS CALORIES WITH SERIOUS COMORBIDITY AND BODY MASS INDEX (BMI) OF 31.0 TO 31.9 IN ADULT: ICD-10-CM

## 2020-06-24 DIAGNOSIS — E11.59 HYPERTENSION ASSOCIATED WITH DIABETES: ICD-10-CM

## 2020-06-24 DIAGNOSIS — E11.42 TYPE 2 DIABETES MELLITUS WITH DIABETIC POLYNEUROPATHY, WITHOUT LONG-TERM CURRENT USE OF INSULIN: ICD-10-CM

## 2020-06-24 DIAGNOSIS — E78.5 HYPERLIPIDEMIA ASSOCIATED WITH TYPE 2 DIABETES MELLITUS: ICD-10-CM

## 2020-06-24 DIAGNOSIS — I15.2 HYPERTENSION ASSOCIATED WITH DIABETES: ICD-10-CM

## 2020-06-24 PROCEDURE — 99215 PR OFFICE/OUTPT VISIT, EST, LEVL V, 40-54 MIN: ICD-10-PCS | Mod: S$PBB,,, | Performed by: FAMILY MEDICINE

## 2020-06-24 PROCEDURE — 99999 PR PBB SHADOW E&M-EST. PATIENT-LVL III: ICD-10-PCS | Mod: PBBFAC,,, | Performed by: FAMILY MEDICINE

## 2020-06-24 PROCEDURE — 99213 OFFICE O/P EST LOW 20 MIN: CPT | Mod: PBBFAC,25,PO | Performed by: FAMILY MEDICINE

## 2020-06-24 PROCEDURE — 93005 ELECTROCARDIOGRAM TRACING: CPT | Mod: PBBFAC,PO | Performed by: INTERNAL MEDICINE

## 2020-06-24 PROCEDURE — 93010 EKG 12-LEAD: ICD-10-PCS | Mod: S$PBB,,, | Performed by: INTERNAL MEDICINE

## 2020-06-24 PROCEDURE — 93010 ELECTROCARDIOGRAM REPORT: CPT | Mod: S$PBB,,, | Performed by: INTERNAL MEDICINE

## 2020-06-24 PROCEDURE — 99215 OFFICE O/P EST HI 40 MIN: CPT | Mod: S$PBB,,, | Performed by: FAMILY MEDICINE

## 2020-06-24 PROCEDURE — 99999 PR PBB SHADOW E&M-EST. PATIENT-LVL III: CPT | Mod: PBBFAC,,, | Performed by: FAMILY MEDICINE

## 2020-07-06 ENCOUNTER — HOSPITAL ENCOUNTER (OUTPATIENT)
Dept: RADIOLOGY | Facility: HOSPITAL | Age: 65
Discharge: HOME | End: 2020-07-06
Attending: FAMILY MEDICINE
Payer: MEDICARE

## 2020-07-06 DIAGNOSIS — Z01.818 PREOP EXAMINATION: ICD-10-CM

## 2020-07-06 PROCEDURE — 71046 XR CHEST PA AND LATERAL: ICD-10-PCS | Mod: 26,,, | Performed by: RADIOLOGY

## 2020-07-06 PROCEDURE — 71046 X-RAY EXAM CHEST 2 VIEWS: CPT | Mod: 26,,, | Performed by: RADIOLOGY

## 2020-07-06 PROCEDURE — 71046 X-RAY EXAM CHEST 2 VIEWS: CPT | Mod: TC,PN

## 2020-07-17 DIAGNOSIS — Z71.89 COMPLEX CARE COORDINATION: ICD-10-CM

## 2020-08-13 ENCOUNTER — PATIENT MESSAGE (OUTPATIENT)
Dept: ENDOCRINOLOGY | Facility: CLINIC | Age: 65
End: 2020-08-13

## 2020-08-13 DIAGNOSIS — E29.1 HYPOGONADISM IN MALE: ICD-10-CM

## 2020-08-13 RX ORDER — TESTOSTERONE 50 MG/5G
GEL TRANSDERMAL
Qty: 150 G | Refills: 3 | Status: SHIPPED | OUTPATIENT
Start: 2020-08-13

## 2020-10-01 ENCOUNTER — PATIENT MESSAGE (OUTPATIENT)
Dept: OTHER | Facility: OTHER | Age: 65
End: 2020-10-01

## 2020-10-05 ENCOUNTER — PATIENT MESSAGE (OUTPATIENT)
Dept: INTERNAL MEDICINE | Facility: CLINIC | Age: 65
End: 2020-10-05

## 2020-12-31 DIAGNOSIS — I10 ESSENTIAL HYPERTENSION: ICD-10-CM

## 2020-12-31 RX ORDER — LISINOPRIL 10 MG/1
10 TABLET ORAL DAILY
Qty: 90 TABLET | Refills: 0 | Status: SHIPPED | OUTPATIENT
Start: 2020-12-31

## 2021-01-04 ENCOUNTER — PATIENT MESSAGE (OUTPATIENT)
Dept: ADMINISTRATIVE | Facility: HOSPITAL | Age: 66
End: 2021-01-04

## 2021-01-05 ENCOUNTER — PATIENT OUTREACH (OUTPATIENT)
Dept: ADMINISTRATIVE | Facility: HOSPITAL | Age: 66
End: 2021-01-05

## 2021-03-29 DIAGNOSIS — E11.42 TYPE 2 DIABETES MELLITUS WITH DIABETIC POLYNEUROPATHY, WITHOUT LONG-TERM CURRENT USE OF INSULIN: ICD-10-CM

## 2021-04-05 DIAGNOSIS — E11.42 TYPE 2 DIABETES MELLITUS WITH DIABETIC POLYNEUROPATHY, WITHOUT LONG-TERM CURRENT USE OF INSULIN: ICD-10-CM

## 2021-04-06 DIAGNOSIS — E11.42 TYPE 2 DIABETES MELLITUS WITH DIABETIC POLYNEUROPATHY, WITHOUT LONG-TERM CURRENT USE OF INSULIN: ICD-10-CM

## 2021-05-14 ENCOUNTER — PES CALL (OUTPATIENT)
Dept: ADMINISTRATIVE | Facility: CLINIC | Age: 66
End: 2021-05-14

## 2021-05-25 DIAGNOSIS — E11.42 TYPE 2 DIABETES MELLITUS WITH DIABETIC POLYNEUROPATHY, WITHOUT LONG-TERM CURRENT USE OF INSULIN: ICD-10-CM

## 2022-03-08 ENCOUNTER — PES CALL (OUTPATIENT)
Dept: ADMINISTRATIVE | Facility: CLINIC | Age: 67
End: 2022-03-08
Payer: MEDICARE

## 2022-12-22 ENCOUNTER — OFFICE VISIT (OUTPATIENT)
Dept: SURGERY | Facility: CLINIC | Age: 67
End: 2022-12-22
Attending: SPECIALIST
Payer: MEDICARE

## 2022-12-22 ENCOUNTER — PATIENT MESSAGE (OUTPATIENT)
Dept: SURGERY | Facility: CLINIC | Age: 67
End: 2022-12-22

## 2022-12-22 VITALS
HEART RATE: 84 BPM | DIASTOLIC BLOOD PRESSURE: 61 MMHG | WEIGHT: 215 LBS | OXYGEN SATURATION: 97 % | SYSTOLIC BLOOD PRESSURE: 100 MMHG | HEIGHT: 74 IN | BODY MASS INDEX: 27.59 KG/M2

## 2022-12-22 DIAGNOSIS — R22.42 LEG MASS, LEFT: Primary | ICD-10-CM

## 2022-12-22 PROCEDURE — 99214 OFFICE O/P EST MOD 30 MIN: CPT | Mod: PBBFAC | Performed by: SPECIALIST

## 2022-12-22 PROCEDURE — 99999 PR PBB SHADOW E&M-EST. PATIENT-LVL IV: CPT | Mod: PBBFAC,,, | Performed by: SPECIALIST

## 2022-12-22 PROCEDURE — 99999 PR PBB SHADOW E&M-EST. PATIENT-LVL IV: ICD-10-PCS | Mod: PBBFAC,,, | Performed by: SPECIALIST

## 2022-12-22 NOTE — PROGRESS NOTES
History & Physical    SUBJECTIVE:     History of Present Illness:  Patient is a 67 y.o. male noted lump left medial calf also seen by derm. Slowly increasing in size.       Chief Complaint   Patient presents with    Lump     Lt leg        Review of patient's allergies indicates:   Allergen Reactions    Penicillins Other (See Comments)     Other reaction(s): Unknown  Patient states PCN had no effect on his illness       Current Outpatient Medications   Medication Sig Dispense Refill    aspirin 81 mg Tab Take 1 tablet by mouth.      canagliflozin (INVOKANA) 300 mg Tab tablet Take 1 tablet (300 mg total) by mouth once daily. 90 tablet 0    CLENPIQ 10 mg-3.5 gram -12 gram/160 mL Soln TK UTD  0    diclofenac sodium (SOLARAZE) 3 % gel APPLY 2.0 GRAMS(2 PUMPS)TO THE AFFECTED AREAS UP TO 5 TIMES DAILY (10 GRAMS PER DAY)  5    ergocalciferol (ERGOCALCIFEROL) 50,000 unit Cap TAKE ONE CAPSULE BY MOUTH EVERY 7 DAYS 12 capsule 3    exenatide microspheres (BYDUREON) 2 mg/0.65 mL PnIj Inject 2 mg into the skin every 7 days. 12 each 3    ezetimibe-simvastatin 10-40 mg (VYTORIN) 10-40 mg per tablet Take 1 tablet by mouth nightly. 90 tablet 4    GEL-SELWYN 0.4 % Gel As directed  0    glimepiride (AMARYL) 2 MG tablet Take 1 tablet (2 mg total) by mouth before breakfast. 30 tablet 11    HYDROcodone-acetaminophen (NORCO) 7.5-325 mg per tablet TK 1 T PO  Q 6 TO 8 H PRN  0    insulin lispro (HUMALOG KWIKPEN INSULIN) 100 unit/mL pen INJECT PER SLIDING SCALE. USE UP TO 15 UNITS WITH EACH MEAL 15 mL 3    ketoconazole (NIZORAL) 2 % cream APPLY TO AA BID  0    lisinopriL 10 MG tablet Take 1 tablet (10 mg total) by mouth once daily. 90 tablet 0    LYRICA 150 mg capsule Take 1 capsule by mouth 2 (two) times daily.  5    metFORMIN (GLUCOPHAGE) 1000 MG tablet TAKE ONE TABLET BY MOUTH TWICE DAILY WITH MEALS 180 tablet 3    naproxen sodium 375 mg TM24 TAKE 1 TO 3 TABLETS BY MOUTH ONCE DAILY  1    omega-3 fatty acids-vitamin E (FISH OIL) 1,000 mg Cap  "Take 1,000 mg by mouth Twice daily.      omeprazole (PRILOSEC) 40 MG capsule Take 40 mg by mouth daily as needed.  4    ONETOUCH VERIO Strp USE TO TEST BLOOD SUGAR THREE TIMES DAILY AS DIRECTED 300 strip 0    oxiconazole (OXISTAT) 1 % Crea APPLY AA BID  2    pen needle, diabetic (BD ULTRA-FINE MINI PEN NEEDLE) 31 gauge x 3/16" Ndle USE AS DIRECTED WITH HUMALOG  each 6    tadalafil (CIALIS) 10 MG tablet TAKE 1 TABLET BY MOUTH AS NEEDED 10 tablet 5    testosterone (TESTIM) 50 mg/5 gram (1 %) Gel APPLY 5 GRAMS TOPICALLY ONCE DAILY 150 g 3    zonisamide (ZONEGRAN) 50 MG Cap TK ONE C PO TID  2     No current facility-administered medications for this visit.       Past Medical History:   Diagnosis Date    Colon polyp 2009    HTN (hypertension), benign     Hyperlipidemia LDL goal < 70     Hypogonadism, male     Obesity (BMI 30-39.9) 2/7/2014    PN (peripheral neuropathy)     Skin cancer     leg    Type II or unspecified type diabetes mellitus with neurological manifestations, uncontrolled(250.62) 11/3/2014    Vitamin D deficiency 2/7/2014     Past Surgical History:   Procedure Laterality Date    CATARACT EXTRACTION      COLONOSCOPY      COLONOSCOPY N/A 3/13/2019    Procedure: COLONOSCOPY/Clenpiq;  Surgeon: Alona Shaw MD;  Location: [facility];  Service: Endoscopy;  Laterality: N/A;  Earliest appointment/Diabetic    ELBOW SURGERY      ESOPHAGOGASTRODUODENOSCOPY      HERNIA REPAIR      umbicial and inguinal    KNEE SURGERY      Spinal injections       Family History   Problem Relation Age of Onset    Diabetes Father     Heart attack Father     Diabetes Mother     Cancer Mother         pancreas/kidney/duodenum/colon    Diabetes Paternal Grandmother      Social History     Tobacco Use    Smoking status: Never    Smokeless tobacco: Never   Substance Use Topics    Alcohol use: Yes     Comment: maybe 2 drinks a month    Drug use: No        Review of Systems:  Review of Systems    OBJECTIVE:     Vital Signs (Most " "Recent)  Pulse: 84 (12/22/22 0930)  BP: 100/61 (12/22/22 0930)  SpO2: 97 % (12/22/22 0930)  6' 2" (1.88 m)  97.5 kg (215 lb)     Physical Exam:  Physical Exam  Skin:     Comments: 1 cm mobile, firm SQ nodule left medial calf.      Laboratory      Diagnostic Results:      ASSESSMENT/PLAN:     Left calf mass    PLAN:Plan     RTC for local excision            "

## 2023-01-09 ENCOUNTER — PROCEDURE VISIT (OUTPATIENT)
Dept: SURGERY | Facility: CLINIC | Age: 68
End: 2023-01-09
Attending: SPECIALIST
Payer: MEDICARE

## 2023-01-09 VITALS
WEIGHT: 210 LBS | HEIGHT: 74 IN | HEART RATE: 86 BPM | DIASTOLIC BLOOD PRESSURE: 62 MMHG | OXYGEN SATURATION: 99 % | SYSTOLIC BLOOD PRESSURE: 120 MMHG | BODY MASS INDEX: 26.95 KG/M2

## 2023-01-09 DIAGNOSIS — R22.42 LOWER LEG MASS, LEFT: Primary | ICD-10-CM

## 2023-01-09 PROCEDURE — 88307 PR  SURG PATH,LEVEL V: ICD-10-PCS | Mod: 26,,, | Performed by: PATHOLOGY

## 2023-01-09 PROCEDURE — 11402 EXC TR-EXT B9+MARG 1.1-2 CM: CPT | Mod: PBBFAC | Performed by: SPECIALIST

## 2023-01-09 PROCEDURE — 88304 TISSUE EXAM BY PATHOLOGIST: CPT | Performed by: PATHOLOGY

## 2023-01-09 PROCEDURE — 88307 TISSUE EXAM BY PATHOLOGIST: CPT | Mod: 26,,, | Performed by: PATHOLOGY

## 2023-01-09 PROCEDURE — 88307 TISSUE EXAM BY PATHOLOGIST: CPT | Performed by: PATHOLOGY

## 2023-01-09 NOTE — PROCEDURES
Excision of Mass Left Calf    Date/Time: 1/9/2023 2:30 PM  Performed by: Thomas Brannon MD  Authorized by: Thomas Brannon MD     Timeout: prior to procedure the correct patient, procedure, and site was verified    Prep: patient was prepped and draped in usual sterile fashion    Local anesthesia used?: Yes    Anesthesia:  Local infiltration  Local anesthetic:  Lidocaine 1% with epinephrine  Anesthetic total (ml):  10  Assistants?: Yes    List of assistants:  Naty Brito and Nicole Roman I was present for the entire procedure.  Indications:  Cyst  Body area:  Lower leg / ankle  Laterality:  Left  Position:  Supine   Patient was prepped and draped in the normal sterile fashion.  Anesthesia:  Local infiltration  Local anesthetic:  Lidocaine 1% with epinephrine  Excision type:  Skin  Malignancy:  Benign  Excision size (cm):  1.2  Scalpel size:  11  Incision type:  Single straight  Specimens?: Yes     Specimens submitted to pathology.  Wound closure:  Simple  Wound repair size (cm):  1.2  Sutures:  3-0 Vicryl (4-0 Vicryl)  Sterile dressings:  Gauze and Steri-strips   Needle, instrument, and sponge counts were correct.   Patient tolerated the procedure well with no immediate complications.   Post-operative instructions were provided for the patient.  Comments:      7 mm firm mass easily excised and sent for pathology. After 48 hours may take gauze dressing off, leave steri strips on will fall off on their own. Will call with pathology results.

## 2023-01-18 LAB
FINAL PATHOLOGIC DIAGNOSIS: NORMAL
GROSS: NORMAL
Lab: NORMAL